# Patient Record
Sex: FEMALE | Race: WHITE | NOT HISPANIC OR LATINO
[De-identification: names, ages, dates, MRNs, and addresses within clinical notes are randomized per-mention and may not be internally consistent; named-entity substitution may affect disease eponyms.]

---

## 2017-01-18 ENCOUNTER — RESULT REVIEW (OUTPATIENT)
Age: 57
End: 2017-01-18

## 2017-04-12 ENCOUNTER — APPOINTMENT (OUTPATIENT)
Dept: DERMATOLOGY | Facility: CLINIC | Age: 57
End: 2017-04-12

## 2017-06-27 ENCOUNTER — APPOINTMENT (OUTPATIENT)
Dept: DERMATOLOGY | Facility: CLINIC | Age: 57
End: 2017-06-27

## 2017-10-23 ENCOUNTER — APPOINTMENT (OUTPATIENT)
Dept: DERMATOLOGY | Facility: CLINIC | Age: 57
End: 2017-10-23
Payer: SELF-PAY

## 2017-10-23 DIAGNOSIS — E65 LOCALIZED ADIPOSITY: ICD-10-CM

## 2017-10-23 PROCEDURE — D0118 KYBELLA: CPT

## 2017-10-31 ENCOUNTER — APPOINTMENT (OUTPATIENT)
Dept: DERMATOLOGY | Facility: CLINIC | Age: 57
End: 2017-10-31
Payer: COMMERCIAL

## 2017-10-31 VITALS — SYSTOLIC BLOOD PRESSURE: 110 MMHG | DIASTOLIC BLOOD PRESSURE: 64 MMHG

## 2017-10-31 DIAGNOSIS — Z12.83 ENCOUNTER FOR SCREENING FOR MALIGNANT NEOPLASM OF SKIN: ICD-10-CM

## 2017-10-31 DIAGNOSIS — L82.1 OTHER SEBORRHEIC KERATOSIS: ICD-10-CM

## 2017-10-31 DIAGNOSIS — L81.4 OTHER MELANIN HYPERPIGMENTATION: ICD-10-CM

## 2017-10-31 DIAGNOSIS — D23.9 OTHER BENIGN NEOPLASM OF SKIN, UNSPECIFIED: ICD-10-CM

## 2017-10-31 PROCEDURE — 99213 OFFICE O/P EST LOW 20 MIN: CPT | Mod: GC

## 2017-12-21 ENCOUNTER — APPOINTMENT (OUTPATIENT)
Dept: DERMATOLOGY | Facility: CLINIC | Age: 57
End: 2017-12-21
Payer: COMMERCIAL

## 2017-12-21 VITALS — SYSTOLIC BLOOD PRESSURE: 110 MMHG | DIASTOLIC BLOOD PRESSURE: 60 MMHG

## 2017-12-21 DIAGNOSIS — I78.1 NEVUS, NON-NEOPLASTIC: ICD-10-CM

## 2017-12-21 PROCEDURE — 99212 OFFICE O/P EST SF 10 MIN: CPT

## 2019-06-03 ENCOUNTER — RESULT REVIEW (OUTPATIENT)
Age: 59
End: 2019-06-03

## 2020-08-17 ENCOUNTER — APPOINTMENT (OUTPATIENT)
Dept: ORTHOPEDIC SURGERY | Facility: CLINIC | Age: 60
End: 2020-08-17
Payer: COMMERCIAL

## 2020-08-17 VITALS
HEIGHT: 62 IN | TEMPERATURE: 97.8 F | DIASTOLIC BLOOD PRESSURE: 84 MMHG | BODY MASS INDEX: 22.63 KG/M2 | HEART RATE: 73 BPM | WEIGHT: 123 LBS | SYSTOLIC BLOOD PRESSURE: 132 MMHG

## 2020-08-17 PROCEDURE — 99204 OFFICE O/P NEW MOD 45 MIN: CPT

## 2020-08-17 PROCEDURE — 72050 X-RAY EXAM NECK SPINE 4/5VWS: CPT

## 2020-08-26 ENCOUNTER — APPOINTMENT (OUTPATIENT)
Dept: ORTHOPEDIC SURGERY | Facility: CLINIC | Age: 60
End: 2020-08-26
Payer: COMMERCIAL

## 2020-08-26 VITALS — BODY MASS INDEX: 22.63 KG/M2 | HEIGHT: 62 IN | TEMPERATURE: 97.7 F | WEIGHT: 123 LBS

## 2020-08-26 PROCEDURE — 99214 OFFICE O/P EST MOD 30 MIN: CPT

## 2020-08-27 ENCOUNTER — TRANSCRIPTION ENCOUNTER (OUTPATIENT)
Age: 60
End: 2020-08-27

## 2020-09-02 ENCOUNTER — APPOINTMENT (OUTPATIENT)
Dept: SPINE | Facility: CLINIC | Age: 60
End: 2020-09-02
Payer: COMMERCIAL

## 2020-09-02 VITALS
SYSTOLIC BLOOD PRESSURE: 123 MMHG | HEIGHT: 62 IN | WEIGHT: 124 LBS | OXYGEN SATURATION: 100 % | DIASTOLIC BLOOD PRESSURE: 86 MMHG | BODY MASS INDEX: 22.82 KG/M2 | HEART RATE: 69 BPM | TEMPERATURE: 96.9 F

## 2020-09-02 PROCEDURE — 99204 OFFICE O/P NEW MOD 45 MIN: CPT

## 2020-09-02 RX ORDER — AMOXICILLIN 500 MG/1
500 CAPSULE ORAL
Qty: 21 | Refills: 0 | Status: DISCONTINUED | COMMUNITY
Start: 2020-07-23 | End: 2020-09-02

## 2020-09-02 RX ORDER — ALPRAZOLAM 0.25 MG/1
0.25 TABLET ORAL
Refills: 0 | Status: ACTIVE | COMMUNITY

## 2020-09-03 NOTE — RESULTS/DATA
[FreeTextEntry1] : C3-C4 disc osteophyte mostly on the left side\par C4-C5 disc bulge causing stenosis bilaterally\par C5-C6 disc osteophyte causing bilateral foraminal stenosis.

## 2020-09-03 NOTE — ASSESSMENT
[FreeTextEntry1] : See dictation by Dr. Vinny M.D.  A 3 level anterior cervical discectomy and fusion at C3-C4, C4-C5 and C5-C6 was discussed with the patient.  The patient does not choose to do surgery at this time.  She is going to consider it and is to start physical therapy.  She will call the office is she decides on having surgery.

## 2020-09-03 NOTE — HISTORY OF PRESENT ILLNESS
[de-identified] : JOSE HERNÁNDEZ is a 59 year old lady who has been followed by Dr. Nathan Irving for about 25 years regarding tingling and numbness in her hands.  She has received physical therapy may times throughout the years which she stated has helped.  The fell down the 13 steps on July 20 th and has worsening of her symptoms.  She has neck pain which radiates into mostly her left shoulder, forearm, elbow and hand.  She has taken steroids with little relief.  She feels that she has also been losing balance, feels tingling to the bottom.  She also c/o left ankle pain.\par

## 2020-09-03 NOTE — DATA REVIEWED
[de-identified] : Cervical spine done at [facility] on 8/20/2020.  Also images done on 4/29/2011 2011 and 4/6/ 2015 Lumbar 4/6/2015 thoracic 4/29/2011

## 2020-09-03 NOTE — PHYSICAL EXAM
[Place] : oriented to place [Person] : oriented to person [Time] : oriented to time [Short Term Intact] : short term memory intact [Remote Intact] : remote memory intact [Span Intact] : the attention span was normal [Concentration Intact] : normal concentrating ability [Fluency] : fluency intact [Comprehension] : comprehension intact [Current Events] : adequate knowledge of current events [Vocabulary] : adequate range of vocabulary [Past History] : adequate knowledge of personal past history [Cranial Nerves Optic (II)] : visual acuity intact bilaterally,  pupils equal round and reactive to light [Cranial Nerves Oculomotor (III)] : extraocular motion intact [Cranial Nerves Facial (VII)] : face symmetrical [Cranial Nerves Trigeminal (V)] : facial sensation intact symmetrically [Cranial Nerves Accessory (XI - Cranial And Spinal)] : head turning and shoulder shrug symmetric [Cranial Nerves Glossopharyngeal (IX)] : tongue and palate midline [Cranial Nerves Vestibulocochlear (VIII)] : hearing was intact bilaterally [Motor Tone] : muscle tone was normal in all four extremities [Motor Strength] : muscle strength was normal in all four extremities [Cranial Nerves Hypoglossal (XII)] : there was no tongue deviation with protrusion [No Muscle Atrophy] : normal bulk in all four extremities [4] : T1 abductor digiti minimi 4/5 [5] : S1 ankle flexors 5/5 [Abnormal Walk] : normal gait [Sensation Tactile Decrease] : light touch was intact [Balance] : balance was intact [2+] : Patella left 2+ [Past-pointing] : there was no past-pointing [Tremor] : no tremor present

## 2020-09-03 NOTE — REASON FOR VISIT
[New Patient Visit] : a new patient visit [Referred By: _________] : Patient was referred by EDITH [FreeTextEntry1] : The patient c/o neck pain with radiation down her left arm into her hand.

## 2020-10-21 ENCOUNTER — APPOINTMENT (OUTPATIENT)
Dept: ORTHOPEDIC SURGERY | Facility: CLINIC | Age: 60
End: 2020-10-21
Payer: COMMERCIAL

## 2020-10-21 VITALS — WEIGHT: 124 LBS | TEMPERATURE: 97.9 F | BODY MASS INDEX: 22.82 KG/M2 | HEIGHT: 62 IN

## 2020-10-21 PROCEDURE — 99213 OFFICE O/P EST LOW 20 MIN: CPT

## 2020-10-21 PROCEDURE — 99072 ADDL SUPL MATRL&STAF TM PHE: CPT

## 2020-12-02 ENCOUNTER — APPOINTMENT (OUTPATIENT)
Dept: ORTHOPEDIC SURGERY | Facility: CLINIC | Age: 60
End: 2020-12-02
Payer: COMMERCIAL

## 2020-12-02 VITALS — BODY MASS INDEX: 22.82 KG/M2 | HEIGHT: 62 IN | TEMPERATURE: 97.2 F | WEIGHT: 124 LBS

## 2020-12-02 DIAGNOSIS — M75.42 IMPINGEMENT SYNDROME OF LEFT SHOULDER: ICD-10-CM

## 2020-12-02 PROCEDURE — 99072 ADDL SUPL MATRL&STAF TM PHE: CPT

## 2020-12-02 PROCEDURE — 99214 OFFICE O/P EST MOD 30 MIN: CPT

## 2021-01-04 ENCOUNTER — APPOINTMENT (OUTPATIENT)
Dept: NEUROLOGY | Facility: CLINIC | Age: 61
End: 2021-01-04

## 2021-01-19 ENCOUNTER — APPOINTMENT (OUTPATIENT)
Dept: NEUROLOGY | Facility: CLINIC | Age: 61
End: 2021-01-19

## 2021-05-10 ENCOUNTER — APPOINTMENT (OUTPATIENT)
Dept: ORTHOPEDIC SURGERY | Facility: CLINIC | Age: 61
End: 2021-05-10

## 2021-05-17 ENCOUNTER — APPOINTMENT (OUTPATIENT)
Dept: ORTHOPEDIC SURGERY | Facility: CLINIC | Age: 61
End: 2021-05-17
Payer: COMMERCIAL

## 2021-05-17 VITALS — TEMPERATURE: 97.2 F | WEIGHT: 127 LBS | HEIGHT: 62 IN | BODY MASS INDEX: 23.37 KG/M2

## 2021-05-17 DIAGNOSIS — M75.41 IMPINGEMENT SYNDROME OF RIGHT SHOULDER: ICD-10-CM

## 2021-05-17 DIAGNOSIS — M75.42 IMPINGEMENT SYNDROME OF LEFT SHOULDER: ICD-10-CM

## 2021-05-17 PROCEDURE — 20610 DRAIN/INJ JOINT/BURSA W/O US: CPT | Mod: LT

## 2021-05-17 PROCEDURE — 99072 ADDL SUPL MATRL&STAF TM PHE: CPT

## 2021-05-17 PROCEDURE — 99215 OFFICE O/P EST HI 40 MIN: CPT | Mod: 25

## 2021-05-17 PROCEDURE — 73030 X-RAY EXAM OF SHOULDER: CPT | Mod: 50

## 2021-05-17 RX ORDER — METHYLPRED ACET/NACL,ISO-OS/PF 40 MG/ML
40 VIAL (ML) INJECTION
Qty: 1 | Refills: 0 | Status: COMPLETED | OUTPATIENT
Start: 2021-05-17

## 2021-05-17 RX ORDER — LIDOCAINE HYDROCHLORIDE 10 MG/ML
1 INJECTION, SOLUTION INFILTRATION; PERINEURAL
Refills: 0 | Status: COMPLETED | OUTPATIENT
Start: 2021-05-17

## 2021-05-17 RX ADMIN — METHYLPREDNISOLONE ACETATE 2 MG/ML: 40 INJECTION, SUSPENSION INTRALESIONAL; INTRAMUSCULAR; INTRASYNOVIAL; SOFT TISSUE at 00:00

## 2021-05-17 RX ADMIN — LIDOCAINE HYDROCHLORIDE 3 %: 10 INJECTION, SOLUTION INFILTRATION; PERINEURAL at 00:00

## 2021-06-14 ENCOUNTER — APPOINTMENT (OUTPATIENT)
Dept: ORTHOPEDIC SURGERY | Facility: CLINIC | Age: 61
End: 2021-06-14
Payer: COMMERCIAL

## 2021-06-14 VITALS — HEIGHT: 62 IN | TEMPERATURE: 97.8 F | WEIGHT: 127 LBS | BODY MASS INDEX: 23.37 KG/M2

## 2021-06-14 PROCEDURE — 99072 ADDL SUPL MATRL&STAF TM PHE: CPT

## 2021-06-14 PROCEDURE — 99213 OFFICE O/P EST LOW 20 MIN: CPT

## 2021-07-07 ENCOUNTER — APPOINTMENT (OUTPATIENT)
Dept: ORTHOPEDIC SURGERY | Facility: CLINIC | Age: 61
End: 2021-07-07

## 2021-10-27 ENCOUNTER — NON-APPOINTMENT (OUTPATIENT)
Age: 61
End: 2021-10-27

## 2021-10-27 ENCOUNTER — APPOINTMENT (OUTPATIENT)
Dept: ORTHOPEDIC SURGERY | Facility: CLINIC | Age: 61
End: 2021-10-27
Payer: COMMERCIAL

## 2021-10-27 VITALS
HEART RATE: 71 BPM | HEIGHT: 62 IN | WEIGHT: 127 LBS | SYSTOLIC BLOOD PRESSURE: 123 MMHG | BODY MASS INDEX: 23.37 KG/M2 | DIASTOLIC BLOOD PRESSURE: 79 MMHG

## 2021-10-27 DIAGNOSIS — M51.24 OTHER INTERVERTEBRAL DISC DISPLACEMENT, THORACIC REGION: ICD-10-CM

## 2021-10-27 PROCEDURE — 99214 OFFICE O/P EST MOD 30 MIN: CPT

## 2022-04-04 ENCOUNTER — APPOINTMENT (OUTPATIENT)
Dept: ORTHOPEDIC SURGERY | Facility: CLINIC | Age: 62
End: 2022-04-04

## 2022-04-07 ENCOUNTER — APPOINTMENT (OUTPATIENT)
Dept: OTOLARYNGOLOGY | Facility: CLINIC | Age: 62
End: 2022-04-07

## 2022-04-27 ENCOUNTER — APPOINTMENT (OUTPATIENT)
Dept: PULMONOLOGY | Facility: CLINIC | Age: 62
End: 2022-04-27

## 2022-05-17 ENCOUNTER — APPOINTMENT (OUTPATIENT)
Dept: PEDIATRIC ALLERGY IMMUNOLOGY | Facility: CLINIC | Age: 62
End: 2022-05-17

## 2022-10-31 ENCOUNTER — INPATIENT (INPATIENT)
Facility: HOSPITAL | Age: 62
LOS: 1 days | Discharge: HOME | DRG: 552 | End: 2022-11-02
Attending: STUDENT IN AN ORGANIZED HEALTH CARE EDUCATION/TRAINING PROGRAM | Admitting: STUDENT IN AN ORGANIZED HEALTH CARE EDUCATION/TRAINING PROGRAM
Payer: COMMERCIAL

## 2022-10-31 VITALS
HEIGHT: 62 IN | WEIGHT: 134.92 LBS | HEART RATE: 84 BPM | SYSTOLIC BLOOD PRESSURE: 148 MMHG | DIASTOLIC BLOOD PRESSURE: 92 MMHG | RESPIRATION RATE: 18 BRPM | OXYGEN SATURATION: 99 %

## 2022-10-31 DIAGNOSIS — R20.2 PARESTHESIA OF SKIN: ICD-10-CM

## 2022-10-31 LAB
ALBUMIN SERPL ELPH-MCNC: 4 G/DL — SIGNIFICANT CHANGE UP (ref 3.3–5)
ALP SERPL-CCNC: 85 U/L — SIGNIFICANT CHANGE UP (ref 40–120)
ALT FLD-CCNC: 20 U/L — SIGNIFICANT CHANGE UP (ref 10–45)
ANION GAP SERPL CALC-SCNC: 12 MMOL/L — SIGNIFICANT CHANGE UP (ref 5–17)
AST SERPL-CCNC: 32 U/L — SIGNIFICANT CHANGE UP (ref 10–40)
BASOPHILS # BLD AUTO: 0.05 K/UL — SIGNIFICANT CHANGE UP (ref 0–0.2)
BASOPHILS NFR BLD AUTO: 0.8 % — SIGNIFICANT CHANGE UP (ref 0–2)
BILIRUB SERPL-MCNC: 0.4 MG/DL — SIGNIFICANT CHANGE UP (ref 0.2–1.2)
BUN SERPL-MCNC: 8 MG/DL — SIGNIFICANT CHANGE UP (ref 7–23)
CALCIUM SERPL-MCNC: 8.9 MG/DL — SIGNIFICANT CHANGE UP (ref 8.4–10.5)
CHLORIDE SERPL-SCNC: 101 MMOL/L — SIGNIFICANT CHANGE UP (ref 96–108)
CO2 SERPL-SCNC: 21 MMOL/L — LOW (ref 22–31)
CREAT SERPL-MCNC: 0.76 MG/DL — SIGNIFICANT CHANGE UP (ref 0.5–1.3)
CRP SERPL-MCNC: 49 MG/L — HIGH (ref 0–4)
EGFR: 90 ML/MIN/1.73M2 — SIGNIFICANT CHANGE UP
EOSINOPHIL # BLD AUTO: 0.24 K/UL — SIGNIFICANT CHANGE UP (ref 0–0.5)
EOSINOPHIL NFR BLD AUTO: 4 % — SIGNIFICANT CHANGE UP (ref 0–6)
GLUCOSE SERPL-MCNC: 106 MG/DL — HIGH (ref 70–99)
HCT VFR BLD CALC: 40.3 % — SIGNIFICANT CHANGE UP (ref 34.5–45)
HGB BLD-MCNC: 13.6 G/DL — SIGNIFICANT CHANGE UP (ref 11.5–15.5)
IMM GRANULOCYTES NFR BLD AUTO: 0.2 % — SIGNIFICANT CHANGE UP (ref 0–0.9)
LYMPHOCYTES # BLD AUTO: 1.93 K/UL — SIGNIFICANT CHANGE UP (ref 1–3.3)
LYMPHOCYTES # BLD AUTO: 32.2 % — SIGNIFICANT CHANGE UP (ref 13–44)
MAGNESIUM SERPL-MCNC: 1.9 MG/DL — SIGNIFICANT CHANGE UP (ref 1.6–2.6)
MCHC RBC-ENTMCNC: 29.4 PG — SIGNIFICANT CHANGE UP (ref 27–34)
MCHC RBC-ENTMCNC: 33.7 GM/DL — SIGNIFICANT CHANGE UP (ref 32–36)
MCV RBC AUTO: 87 FL — SIGNIFICANT CHANGE UP (ref 80–100)
MONOCYTES # BLD AUTO: 0.6 K/UL — SIGNIFICANT CHANGE UP (ref 0–0.9)
MONOCYTES NFR BLD AUTO: 10 % — SIGNIFICANT CHANGE UP (ref 2–14)
NEUTROPHILS # BLD AUTO: 3.17 K/UL — SIGNIFICANT CHANGE UP (ref 1.8–7.4)
NEUTROPHILS NFR BLD AUTO: 52.8 % — SIGNIFICANT CHANGE UP (ref 43–77)
NRBC # BLD: 0 /100 WBCS — SIGNIFICANT CHANGE UP (ref 0–0)
PHOSPHATE SERPL-MCNC: 2.4 MG/DL — LOW (ref 2.5–4.5)
PLATELET # BLD AUTO: 168 K/UL — SIGNIFICANT CHANGE UP (ref 150–400)
POTASSIUM SERPL-MCNC: 4.5 MMOL/L — SIGNIFICANT CHANGE UP (ref 3.5–5.3)
POTASSIUM SERPL-SCNC: 4.5 MMOL/L — SIGNIFICANT CHANGE UP (ref 3.5–5.3)
PROT SERPL-MCNC: 6.8 G/DL — SIGNIFICANT CHANGE UP (ref 6–8.3)
RBC # BLD: 4.63 M/UL — SIGNIFICANT CHANGE UP (ref 3.8–5.2)
RBC # FLD: 12.7 % — SIGNIFICANT CHANGE UP (ref 10.3–14.5)
SODIUM SERPL-SCNC: 134 MMOL/L — LOW (ref 135–145)
WBC # BLD: 6 K/UL — SIGNIFICANT CHANGE UP (ref 3.8–10.5)
WBC # FLD AUTO: 6 K/UL — SIGNIFICANT CHANGE UP (ref 3.8–10.5)

## 2022-10-31 PROCEDURE — 99285 EMERGENCY DEPT VISIT HI MDM: CPT

## 2022-10-31 RX ORDER — ALPRAZOLAM 0.25 MG
1 TABLET ORAL
Qty: 0 | Refills: 0 | DISCHARGE

## 2022-10-31 RX ORDER — ESCITALOPRAM OXALATE 10 MG/1
10 TABLET, FILM COATED ORAL DAILY
Refills: 0 | Status: DISCONTINUED | OUTPATIENT
Start: 2022-10-31 | End: 2022-11-02

## 2022-10-31 RX ORDER — FLUTICASONE FUROATE AND VILANTEROL TRIFENATATE 100; 25 UG/1; UG/1
0 POWDER RESPIRATORY (INHALATION)
Qty: 0 | Refills: 0 | DISCHARGE

## 2022-10-31 RX ORDER — ESCITALOPRAM OXALATE 10 MG/1
1 TABLET, FILM COATED ORAL
Qty: 0 | Refills: 0 | DISCHARGE

## 2022-10-31 RX ORDER — ESOMEPRAZOLE MAGNESIUM 40 MG/1
1 CAPSULE, DELAYED RELEASE ORAL
Qty: 0 | Refills: 0 | DISCHARGE

## 2022-10-31 NOTE — H&P ADULT - NSICDXPASTMEDICALHX_GEN_ALL_CORE_FT
PAST MEDICAL HISTORY:  Asthma     Degenerative arthritis of cervical spine     HLD (hyperlipidemia)     Migraines     Stenosis, cervical spine     Synovial cyst of lumbar spine

## 2022-10-31 NOTE — ED PROVIDER NOTE - OBJECTIVE STATEMENT
62 year old F with PMH Migraines, Asthma, HLD, cervical spine stenosis, arthritis of the spine, lumbar synovial cyst presenting with bilateral LE "pins and needles" x1 days. Patient got her COVID booster and flu shot on Saturday. As day progressed, starting feeling myalgias, chills, GI upset. Then Sunday morning woke up feeling BL LE paresthesias, starting in the feet, then progressing upward to the waist. 62 year old F with PMH Migraines, Asthma, HLD, cervical spine stenosis, arthritis of the spine, lumbar synovial cyst presenting with bilateral LE "pins and needles" x1 days. Patient got her COVID booster and flu shot on Saturday. As day progressed, starting feeling myalgias, chills, GI upset. Then Sunday morning woke up feeling BL LE paresthesias, starting in the feet, then progressing upward to the waist. Denies HA, vision changes, bowel or bladder incontinence, gait abnormalities, confusion, syncope, CP, SOB.

## 2022-10-31 NOTE — H&P ADULT - HISTORY OF PRESENT ILLNESS
63 y/o female with PMHx Migraines, Asthma, HLD, cervical spine stenosis, arthritis of the spine, lumbar synovial cyst presenting with pins/needles b/l LE. On Saturday AM, patient received COVID19 moderna booster and flue vaccine along with her . All day Saturday she felt fine and after dinner, she started feeling chills, muscle aches, off balance, GI issues.  felt the same way. On Sunday AM, patient woke up and felt pins and needles on her entire left foot. During the day on Sunday, patient felt like this sensation was radiating up her left leg and today, patient felt pins/needles was now widespread in b/l LE up to her waist. Denies numbness, pain, vision changes, bowel/bladder incontinence.     Of note, patient has an extensive hx of degenerative spine disease and cervical stenosis. Patient says last MRI c spine showed severe cervical spine stenosis for which orthopedic surgeon (Dr. Irving) said it is up to patient if she wants to do surgery. Patient will feel intermittent pins and needles in her hands along with weakness. Patient has also been complaining of dragging her left foot which has been progressing. She goes to physical therapy. Patient did endorse left sided pins/needles appear more noticeable than right sided and that with her spine disease, she has felt discomfort on her left side already.     Over the last 6 months, patient has had abdominal pain, work up revealed 7 cm right ovarian fibroid, pedunculated and necrotic in nature, removal scheduled Nov 17.  63 y/o female with PMHx Migraines, Asthma, HLD, cervical spine stenosis, arthritis of the spine, lumbar synovial cyst presenting with pins/needles b/l LE. On Saturday AM, patient received COVID19 moderna booster and flue vaccine along with her . All day Saturday she felt fine and after dinner, she started feeling chills, muscle aches, off balance, GI issues.  felt the same way. On Sunday AM, patient woke up and felt pins and needles on her entire left foot. During the day on Sunday, patient felt like this sensation was radiating up her left leg and today, patient felt pins/needles was now widespread in b/l LE up to her waist. Denies numbness, pain, vision changes, bowel/bladder incontinence, difficulty raising eyelids, difficulty swallowing. EMG in the past, per patient, has been normal.     Of note, patient has an extensive hx of degenerative spine disease and cervical stenosis. Patient says last MRI c spine showed severe cervical spine stenosis for which orthopedic surgeon (Dr. Irving) said it is up to patient if she wants to do surgery. Patient will feel intermittent pins and needles in her hands along with weakness. Patient has also been complaining of dragging her left foot which has been progressing. She goes to physical therapy. Patient did endorse left sided pins/needles appear more noticeable than right sided and that with her spine disease, she has felt discomfort on her left side already.     Over the last 6 months, patient has had abdominal pain, work up revealed 7 cm right ovarian fibroid, pedunculated and necrotic in nature, removal scheduled Nov 17.

## 2022-10-31 NOTE — ED ADULT NURSE REASSESSMENT NOTE - NS ED NURSE REASSESS COMMENT FT1
Pt bladder scanned after voiding < 80cc of urine noted on scan. MD aware. Pt bladder scanned after voiding < 80cc of urine noted on scan. MD aware. See paper neuro flow for neuro assessment.

## 2022-10-31 NOTE — ED PROVIDER NOTE - CLINICAL SUMMARY MEDICAL DECISION MAKING FREE TEXT BOX
62 year old F with PMH Migraines, Asthma, HLD, cervical spine stenosis, arthritis of the spine, lumbar synovial cyst presenting with bilateral LE "pins and needles" x1 days. VSS, well appearing. Non focal neuro exam. Low suspicion for cauda equina or cord compression. Ddx includes transverse myelitis vs MS. Will get labs, neuro consult. Dispo pending recommendations. 62 year old F with PMH Migraines, Asthma, HLD, cervical spine stenosis, arthritis of the spine, lumbar synovial cyst presenting with bilateral LE "pins and needles" x1 days. VSS, well appearing. Non focal neuro exam. Low suspicion for cauda equina or cord compression. Ddx includes transverse myelitis vs MS. Will get labs, neuro consult. Dispo pending recommendations.  Attending Maryanne Montanez: 62 yfeoame presenting with paresthesias. pt recently had covid and flu vaccine. upon arrival pt hemodynamically stable. reflexes intact, less likely GBS. consider possible transverse myelitis. pt with h/o cerivcal and lumbar disc disease. pt states pain is different from MSK Lukas. no bowel or bladder incontinence. will d/w neuro. consider MRI to furthere valute

## 2022-10-31 NOTE — ED PROVIDER NOTE - NS ED ROS FT
CONST: no fevers, no chills  EYES: no pain, no vision changes  ENT: no sore throat, no ear pain, no change in hearing  CV: no chest pain, no leg swelling  RESP: no shortness of breath, no cough  ABD: no abdominal pain, no nausea, no vomiting, no diarrhea  : no dysuria, no flank pain, no hematuria  MSK: no back pain, no extremity pain  NEURO: no headache, + LE "pins and needles"  HEME: no easy bleeding  SKIN:  no rash

## 2022-10-31 NOTE — H&P ADULT - NSHPLABSRESULTS_GEN_ALL_CORE
.  LABS:                         13.6   6.00  )-----------( 168      ( 31 Oct 2022 20:27 )             40.3     10-31    134<L>  |  101  |  8   ----------------------------<  106<H>  4.5   |  21<L>  |  0.76    Ca    8.9      31 Oct 2022 20:27  Phos  2.4     10-31  Mg     1.9     10-31    TPro  6.8  /  Alb  4.0  /  TBili  0.4  /  DBili  x   /  AST  32  /  ALT  20  /  AlkPhos  85  10-31              RADIOLOGY: none

## 2022-10-31 NOTE — ED ADULT NURSE NOTE - OBJECTIVE STATEMENT
61 y/o female presenting to ed ambulatory, A&ox3, complaining of numbness/tingling in bilateral lower extremities. pt states she received the covid booster and flu vaccine Saturday and since yesterday has had worsening numbness/tingling "pins and needle feeling" in bilateral lower extremities radiating up to lower waist. pt reports feeling like she was having difficulty starting to void. reports unsteady gait requiring usage of a cane. pt denies bowel/bladder incontinence, fevers, n/v/d, sob, recent trauma. breathing spontaneous and unlabored. upper extremities/lower extremities equal in strength and sensation on exam. reports feeling weaker on left side at baseline from back injury. no drift noted in b/l upper/lower extrem. Safety and comfort measures provided, bed locked and in lowest position, side rails up for safety. Call bell within reach. Awaiting md sen.

## 2022-10-31 NOTE — ED PROVIDER NOTE - ATTENDING CONTRIBUTION TO CARE
Attending MD Maryanne Montanez:  I personally have seen and examined this patient.  Resident note reviewed and agree on plan of care and except where noted.  See HPI, PE, and MDM for details.

## 2022-10-31 NOTE — ED ADULT TRIAGE NOTE - CHIEF COMPLAINT QUOTE
pt got covid booster Friday, today developed b/l LE "pins and needles" to both legs up to waist.  denies saddle anesthesia but does endorse difficulty starting stream when urinating today. saw MD and was sent to ED.

## 2022-10-31 NOTE — ED PROVIDER NOTE - PHYSICAL EXAMINATION
GENERAL: Awake, alert, NAD  HEENT: NC/AT, moist mucous membranes, PERRL, EOMI  LUNGS: CTAB, no wheezes or crackles   CARDIAC: RRR, no m/r/g  ABDOMEN: Soft, normal BS, non tender, non distended, no rebound, no guarding  BACK: No midline spinal tenderness, no CVA tenderness  EXT: No edema, no calf tenderness, 2+ DP pulses bilaterally, no deformities.  NEURO: A&Ox3. Moving all extremities.  SKIN: Warm and dry. No rash.  PSYCH: Normal affect. GENERAL: Awake, alert, NAD  HEENT: NC/AT, moist mucous membranes, PERRL, EOMI  LUNGS: CTAB, no wheezes or crackles   CARDIAC: RRR, no m/r/g  ABDOMEN: Soft, normal BS, non tender, non distended, no rebound, no guarding  BACK: No midline spinal tenderness, no CVA tenderness  EXT: No edema, no calf tenderness, 2+ DP pulses bilaterally, no deformities.  NEURO: A&Ox3. Moving all extremities.  SKIN: Warm and dry. No rash.  PSYCH: Normal affect.  Attending Maryanne Montanez: Gen: nad, heent: atraumatic, eomi, perrla, mmm, neck; nttp, cv: rrr, lungs; ctab, abd: soft, nontender, nondistended, no peritoneal signs. ext: wwp, neuro: awake and alert following command aptellar reflexes intact, strenth intact

## 2022-10-31 NOTE — H&P ADULT - ATTENDING SUPERVISION STATEMENT
----- Message from Elsa Bryant PA-C sent at 11/16/2018  1:55 PM CST -----  Contact: saira ( wife)   That was for the bandaid/tape.. Not the drain. I understand that Dr Plascencia removed the drain which is fine.     Thanks!  ----- Message -----  From: Leonora Anton MA  Sent: 11/15/2018  11:48 AM  To: Elsa Bryant PA-C    She said on the tape it has remove it two days   ----- Message -----  From: Elsa Bryant PA-C  Sent: 11/15/2018  10:10 AM  To: Leonora Anton MA    I dont think he was told to remove the drain today. He was supposed to follow up next week with Dr. Leblanc for drain removal though.     Elsa    ----- Message -----  From: Leonora Anton MA  Sent: 11/14/2018   3:37 PM  To: Elsa Bryant PA-C        ----- Message -----  From: Sal Hdz  Sent: 11/14/2018   3:16 PM  To: Annette MOSCOSO Staff     Need to remove drain today and pt wasn't given instructions, would like cb.          ..[phone] (home)                   Resident

## 2022-10-31 NOTE — H&P ADULT - NSHPPHYSICALEXAM_GEN_ALL_CORE
General Exam:   General appearance: No acute distress    Cardiac: RRR  Pulm:         RA        Neurological Exam:  Mental Status: Orientated to self, date and place.  Attention intact.  No dysarthria. Speech fluent.  Cranial Nerves:   Pupillar hippus, EOMI, VFF, no nystagmus.    CN V1-3 intact to light touch .  No facial asymmetry.  Hearing intact to finger rub bilaterally.  Tongue, uvula and palate midline.  Sternocleidomastoid and Trapezius intact bilaterally.    Motor:   Tone: normal.                  Strength:     [] Upper extremity                      Delt       Bicep    Tricep                                                  R         5/5 5/5 5/5 5/5                                               L          5/5 5/5 5/5 5/5  [] Lower extremity                       HF          KE          KF        DF         PF                                               R        5/5 5/5 5/5 5/5 5/5                                               L         5/5 5/5 5/5 5/5 5/5  Pronator drift: none                 Dysmetria: None to finger-nose-finger or heel-shin-heel  No truncal ataxia.    Tremor: No resting, postural or action tremor.  No myoclonus.    Sensation: intact to light touch, pinprick, and proprioception    Deep Tendon Reflexes:     Biceps          Triceps      BR        Patellar        Ankle         Babinski                               R       2+                   2+           3+            3+               2+           downgoing                              L        2+                  2+           3+            3+               2+           neutral    Gait: Drags left foot when walking General Exam:   General appearance: No acute distress    Cardiac: RRR  Pulm:         RA        Neurological Exam:  Mental Status: Orientated to self, date and place.  Attention intact.  No dysarthria. Speech fluent.  Cranial Nerves:   Pupillar hippus, EOMI, VFF, no nystagmus.    CN V1-3 intact to light touch .  No facial asymmetry.  Hearing intact to finger rub bilaterally.  Tongue, uvula and palate midline.  Sternocleidomastoid and Trapezius intact bilaterally.    Motor:   Tone: normal.                  Strength:     [] Upper extremity                      Delt       Bicep    Tricep                                                  R         5/5 5/5 5/5 5/5                                               L          5/5 5/5 5/5 5/5  [] Lower extremity                       HF          KE          KF        DF         PF                                               R        5/5 5/5 5/5 5/5 5/5                                               L         5/5 5/5 5/5 5/5 5/5  Pronator drift: none                 Dysmetria: None to finger-nose-finger or heel-shin-heel  No truncal ataxia.    Tremor: No resting, postural or action tremor.  No myoclonus.    Sensation: intact to light touch, pinprick, and proprioception    Deep Tendon Reflexes:     Biceps          Triceps      BR        Patellar        Ankle         Plantars                               R       2+                   2+           3+            3+               2+           downgoing                              L        2+                  2+           3+            3+               2+           neutral    Gait: Drags left foot when walking

## 2022-10-31 NOTE — ED ADULT NURSE NOTE - INTERVENTIONS DEFINITIONS
[place] to call system/Call bell, personal items and telephone within reach/Instruct patient to call for assistance/Physically safe environment: no spills, clutter or unnecessary equipment/Stretcher in lowest position, wheels locked, appropriate side rails in place/Monitor gait and stability

## 2022-10-31 NOTE — H&P ADULT - ASSESSMENT
61 y/o female with PMHx Migraines, Asthma, HLD, cervical spine stenosis, arthritis of the spine, lumbar synovial cyst presenting with pins/needles b/l LE. On Saturday AM, patient received COVID19 moderna booster and flue vaccine along with her . All day Saturday she felt fine and after dinner, she started feeling chills, muscle aches, off balance, GI issues.  felt the same way. On Sunday AM, patient woke up and felt pins and needles on her entire left foot. During the day on Sunday, patient felt like this sensation was radiating up her left leg and today, patient felt pins/needles was now widespread in b/l LE up to her waist. Denies numbness, pain, vision changes, bowel/bladder incontinence.     Of note, patient has an extensive hx of degenerative spine disease and cervical stenosis. Patient says last MRI c spine showed severe cervical spine stenosis for which orthopedic surgeon (Dr. Irving) said it is up to patient if she wants to do surgery. Patient will feel intermittent pins and needles in her hands along with weakness. Patient has also been complaining of dragging her left foot which has been progressing. She goes to physical therapy. Patient did endorse left sided pins/needles appear more noticeable than right sided and that with her spine disease, she has felt discomfort on her left side already.     Over the last 6 months, patient has had abdominal pain, work up revealed 7 cm right ovarian fibroid, pedunculated and necrotic in nature, removal scheduled Nov 17.     Impression: Subjective pins/needles with generalized spreading in no specific dermatomal distribution with no sensory deficit on exam with sx developing after vaccines and iso spine disease requiring ruling out transverse myelitis vs immune modulating disease. Less likely GBS given intact reflex and no weakness. Progression of spine disease unclear.     Recommendations:   []Admit to neurology floor   []MRI orbits, brain, thoracic, lumbar with and without contrast   [] check UA, Utox,, TSH, T3/T4, RPR, antithyroglobulin Ab, TPO Ab, vitamin B1, B6, B12, folate, homocysteine, methylmalonic acid, lactate, creatnine kinase, ammonia, ESR, CRP  []NSG consult   []q4 neurochecks and vitals   []DVT ppx: Lovenox 40 mg SQ    Case to be seen by general neurology attending, Dr. Márquez.  63 y/o female with PMHx Migraines, Asthma, HLD, cervical spine stenosis, arthritis of the spine, lumbar synovial cyst presenting with pins/needles b/l LE. On Saturday AM, patient received COVID19 moderna booster and flue vaccine along with her . All day Saturday she felt fine and after dinner, she started feeling chills, muscle aches, off balance, GI issues.  felt the same way. On Sunday AM, patient woke up and felt pins and needles on her entire left foot. During the day on Sunday, patient felt like this sensation was radiating up her left leg and today, patient felt pins/needles was now widespread in b/l LE up to her waist. Denies numbness, pain, vision changes, bowel/bladder incontinence, difficulty raising eyelids, difficulty swallowing. EMG in the past, per patient, has been normal.     Of note, patient has an extensive hx of degenerative spine disease and cervical stenosis. Patient says last MRI c spine showed severe cervical spine stenosis for which orthopedic surgeon (Dr. Irving) said it is up to patient if she wants to do surgery. Patient will feel intermittent pins and needles in her hands along with weakness. Patient has also been complaining of dragging her left foot which has been progressing. She goes to physical therapy. Patient did endorse left sided pins/needles appear more noticeable than right sided and that with her spine disease, she has felt discomfort on her left side already.     Over the last 6 months, patient has had abdominal pain, work up revealed 7 cm right ovarian fibroid, pedunculated and necrotic in nature, removal scheduled Nov 17.     Impression: Subjective pins/needles with generalized spreading in no specific dermatomal distribution with no sensory deficit on exam with sx developing after vaccines and iso spine disease requiring ruling out transverse myelitis vs immune modulating disease. Less likely GBS given intact reflex and no weakness. Progression of spine disease unclear.     Recommendations:   []Admit to neurology floor   []MRI orbits, brain, thoracic, lumbar with and without contrast   [] check UA, Utox,, TSH, T3/T4, RPR, antithyroglobulin Ab, TPO Ab, vitamin B1, B6, B12, folate, homocysteine, methylmalonic acid, lactate, creatnine kinase, ammonia, ESR, CRP  []NSG consult   []q4 neurochecks and vitals   []DVT ppx: Lovenox 40 mg SQ    Case to be seen by general neurology attending, Dr. Márquez.

## 2022-10-31 NOTE — H&P ADULT - ATTENDING COMMENTS
Patient seen and examined with neurology team 11/1/2022 AM    68 year old female with hx of degenerative spine disease with cervical spine stenosis (followed by Orthopedic, Dr. Irving), Migraine headaches, Asthma, right uterine fibroid (new diagnosis, scheduled resection 11/17) presents with new onset ascending lower extremity paresthesias and gait instability since 10/29/2022, after receiving vaccinations for COVID and the Flu.     Neurological exam with no motor weakness in UE or LE. Slight increased tone at the ankles (L>R). Reflexes 2+ in UE and LE, thought relatively brisk R biceps compared to left biceps. No ty, no cross adductors, no clonus. Plantar reflex down going on right and mute on the left. Sensations reduced to pinprick over left leg (over shin), though intact over foot. Vibration sensation intact in UE and asymmetric in LE (R toe 10 seconds, L toe 20 seconds, R knee 16 seconds, L knee 9 seconds). Abnormal gait, drags feet, slightly spastic appearing. Cannot tandem walk. Romberg is absent.     Imp:- Ascending LE paresthesias and gait imbalance in the context of recent COVID/FLU vaccination- need to rule out possible transverse myelitis. Lower suspicion for GBS, given intact reflexes.   Awaiting results of MRI neuroaxis w/w/o contrast- if there are signs for brain/cord inflammation- will plan for IV solumedrol 1000 mg QD for 3-5 days (depending on symptoms improvement).   May plan for LP after MRI's completed.   PT consult Patient seen and examined with neurology team 11/1/2022 AM    68 year old female with hx of degenerative spine disease with cervical spine stenosis (followed by Orthopedic, Dr. Irving), Migraine headaches, Asthma, right uterine fibroid (new diagnosis, scheduled resection 11/17) presents with new onset ascending lower extremity paresthesias and gait instability since 10/29/2022, after receiving vaccinations for COVID and the Flu.     Neurological exam with no motor weakness in UE or LE. Slight increased tone at the ankles (L>R). Reflexes 2+ in UE and LE, thought relatively brisk R biceps compared to left biceps. No ty, no cross adductors, no clonus. Plantar reflex down going on right and mute on the left. Sensations reduced to pinprick over left leg (over shin), though intact over foot. Vibration sensation intact in UE and asymmetric in LE (R toe 10 seconds, L toe 20 seconds, R knee 16 seconds, L knee 9 seconds). Abnormal gait, drags feet, slightly spastic appearing. Cannot tandem walk. Romberg is absent.     Imp:- Ascending LE paresthesias and gait imbalance in the context of recent COVID/FLU vaccination- need to rule out possible transverse myelitis. Lower suspicion for GBS, given intact reflexes. Cannot necessarily localize objective sensory deficits on exam.   Awaiting results of MRI neuroaxis w/w/o contrast- if there are signs for brain/cord inflammation- will plan for IV solumedrol 1000 mg QD for 3-5 days (depending on symptoms improvement).   May plan for LP after MRI's completed.   PT consult Patient seen and examined with neurology team 11/1/2022 AM    68 year old female with hx of degenerative spine disease with cervical spine stenosis (followed by Orthopedic, Dr. Irving), Migraine headaches, Asthma, right uterine fibroid (new diagnosis, scheduled resection 11/17) presents with new onset ascending lower extremity paresthesias and gait instability since 10/29/2022, after receiving vaccinations for COVID and the Flu.     Reviewed MRI cervical spine images/report from [facility] 8/20/2020- multilevel cervical spondylosis with posterior osteophytes indenting the anterior aspect of thecal sac most marked at the C3-4 and C5-6 levels with AP diameter spinal canal measures 7 mm.  Moderate severe left C3-4, severe bilateral C4-5, severe bilateral C5-6 and mild bilateral  C6-7 foraminal stenosis. No cord signal abnormality.    Neurological exam with no motor weakness in UE or LE. Slight increased tone at the ankles (L>R). Reflexes 2+ in UE and LE, thought relatively brisk R biceps compared to left biceps. No ty, no cross adductors, no clonus. Plantar reflex down going on right and mute on the left. Sensations reduced to pinprick over left leg (over shin), though intact over foot. Vibration sensation intact in UE and asymmetric in LE (R toe 10 seconds, L toe 20 seconds, R knee 16 seconds, L knee 9 seconds). Abnormal gait, drags feet, slightly spastic appearing. Cannot tandem walk. Romberg is absent.     Imp:- Ascending LE paresthesias and gait imbalance in the context of recent COVID/FLU vaccination- need to rule out possible transverse myelitis. Lower suspicion for GBS, given intact reflexes. Cannot necessarily localize objective sensory deficits on exam.   Awaiting results of MRI neuroaxis w/w/o contrast- if there are signs for brain/cord inflammation- will plan for IV solumedrol 1000 mg QD for 3-5 days (depending on symptoms improvement).   May plan for LP after MRI's completed.   PT consult

## 2022-11-01 LAB
ALBUMIN SERPL ELPH-MCNC: 4.3 G/DL — SIGNIFICANT CHANGE UP (ref 3.3–5)
ALP SERPL-CCNC: 81 U/L — SIGNIFICANT CHANGE UP (ref 40–120)
ALT FLD-CCNC: 18 U/L — SIGNIFICANT CHANGE UP (ref 10–45)
AMMONIA BLD-MCNC: 12 UMOL/L — SIGNIFICANT CHANGE UP (ref 11–55)
ANION GAP SERPL CALC-SCNC: 11 MMOL/L — SIGNIFICANT CHANGE UP (ref 5–17)
AST SERPL-CCNC: 17 U/L — SIGNIFICANT CHANGE UP (ref 10–40)
B BURGDOR C6 AB SER-ACNC: NEGATIVE — SIGNIFICANT CHANGE UP
B BURGDOR IGG+IGM SER-ACNC: 0.53 INDEX — SIGNIFICANT CHANGE UP (ref 0.01–0.89)
BILIRUB SERPL-MCNC: 0.4 MG/DL — SIGNIFICANT CHANGE UP (ref 0.2–1.2)
BUN SERPL-MCNC: 11 MG/DL — SIGNIFICANT CHANGE UP (ref 7–23)
CALCIUM SERPL-MCNC: 9.6 MG/DL — SIGNIFICANT CHANGE UP (ref 8.4–10.5)
CERULOPLASMIN SERPL-MCNC: 30 MG/DL — SIGNIFICANT CHANGE UP (ref 16–45)
CHLORIDE SERPL-SCNC: 104 MMOL/L — SIGNIFICANT CHANGE UP (ref 96–108)
CK SERPL-CCNC: 58 U/L — SIGNIFICANT CHANGE UP (ref 25–170)
CO2 SERPL-SCNC: 24 MMOL/L — SIGNIFICANT CHANGE UP (ref 22–31)
CREAT SERPL-MCNC: 0.78 MG/DL — SIGNIFICANT CHANGE UP (ref 0.5–1.3)
CRP SERPL-MCNC: 35 MG/L — HIGH (ref 0–4)
EGFR: 87 ML/MIN/1.73M2 — SIGNIFICANT CHANGE UP
ERYTHROCYTE [SEDIMENTATION RATE] IN BLOOD: 5 MM/HR — SIGNIFICANT CHANGE UP (ref 0–20)
ERYTHROCYTE [SEDIMENTATION RATE] IN BLOOD: 8 MM/HR — SIGNIFICANT CHANGE UP (ref 0–20)
FT4I SERPL CALC-MCNC: 2.1 FTI% — SIGNIFICANT CHANGE UP (ref 1.4–4.8)
FT4I SERPL CALC-MCNC: 5.7 INDEX — SIGNIFICANT CHANGE UP (ref 4.4–11.4)
GLUCOSE SERPL-MCNC: 103 MG/DL — HIGH (ref 70–99)
HCT VFR BLD CALC: 40.6 % — SIGNIFICANT CHANGE UP (ref 34.5–45)
HCV AB S/CO SERPL IA: 0.21 S/CO — SIGNIFICANT CHANGE UP (ref 0–0.99)
HCV AB SERPL-IMP: SIGNIFICANT CHANGE UP
HCYS SERPL-MCNC: 9.3 UMOL/L — SIGNIFICANT CHANGE UP
HGB BLD-MCNC: 13.6 G/DL — SIGNIFICANT CHANGE UP (ref 11.5–15.5)
LACTATE SERPL-SCNC: 1.7 MMOL/L — SIGNIFICANT CHANGE UP (ref 0.5–2)
MAGNESIUM SERPL-MCNC: 2.2 MG/DL — SIGNIFICANT CHANGE UP (ref 1.6–2.6)
MCHC RBC-ENTMCNC: 29.2 PG — SIGNIFICANT CHANGE UP (ref 27–34)
MCHC RBC-ENTMCNC: 33.5 GM/DL — SIGNIFICANT CHANGE UP (ref 32–36)
MCV RBC AUTO: 87.3 FL — SIGNIFICANT CHANGE UP (ref 80–100)
NRBC # BLD: 0 /100 WBCS — SIGNIFICANT CHANGE UP (ref 0–0)
PHOSPHATE SERPL-MCNC: 2.4 MG/DL — LOW (ref 2.5–4.5)
PLATELET # BLD AUTO: 193 K/UL — SIGNIFICANT CHANGE UP (ref 150–400)
POTASSIUM SERPL-MCNC: 5.1 MMOL/L — SIGNIFICANT CHANGE UP (ref 3.5–5.3)
POTASSIUM SERPL-SCNC: 5.1 MMOL/L — SIGNIFICANT CHANGE UP (ref 3.5–5.3)
PROT SERPL-MCNC: 6.9 G/DL — SIGNIFICANT CHANGE UP (ref 6–8.3)
RBC # BLD: 4.65 M/UL — SIGNIFICANT CHANGE UP (ref 3.8–5.2)
RBC # FLD: 12.5 % — SIGNIFICANT CHANGE UP (ref 10.3–14.5)
SARS-COV-2 RNA SPEC QL NAA+PROBE: SIGNIFICANT CHANGE UP
SODIUM SERPL-SCNC: 139 MMOL/L — SIGNIFICANT CHANGE UP (ref 135–145)
T3/T3 UPTAKE INDEX SERPL-RTO: 34 % — SIGNIFICANT CHANGE UP (ref 28–41)
T4 AB SER-ACNC: 6.1 UG/DL — SIGNIFICANT CHANGE UP (ref 4.6–12)
T4/T3 UPTAKE INDEX SERPL: 1.1 TBI — SIGNIFICANT CHANGE UP (ref 0.8–1.3)
TSH SERPL-MCNC: 1.97 UIU/ML — SIGNIFICANT CHANGE UP (ref 0.27–4.2)
VIT B12 SERPL-MCNC: 517 PG/ML — SIGNIFICANT CHANGE UP (ref 232–1245)
VIT B12 SERPL-MCNC: 593 PG/ML — SIGNIFICANT CHANGE UP (ref 232–1245)
WBC # BLD: 6.12 K/UL — SIGNIFICANT CHANGE UP (ref 3.8–10.5)
WBC # FLD AUTO: 6.12 K/UL — SIGNIFICANT CHANGE UP (ref 3.8–10.5)

## 2022-11-01 PROCEDURE — 72141 MRI NECK SPINE W/O DYE: CPT | Mod: 26

## 2022-11-01 PROCEDURE — 70551 MRI BRAIN STEM W/O DYE: CPT | Mod: 26

## 2022-11-01 PROCEDURE — 72148 MRI LUMBAR SPINE W/O DYE: CPT | Mod: 26

## 2022-11-01 PROCEDURE — 72146 MRI CHEST SPINE W/O DYE: CPT | Mod: 26

## 2022-11-01 PROCEDURE — 99223 1ST HOSP IP/OBS HIGH 75: CPT

## 2022-11-01 RX ORDER — LANOLIN ALCOHOL/MO/W.PET/CERES
5 CREAM (GRAM) TOPICAL AT BEDTIME
Refills: 0 | Status: DISCONTINUED | OUTPATIENT
Start: 2022-11-01 | End: 2022-11-02

## 2022-11-01 RX ORDER — CYCLOBENZAPRINE HYDROCHLORIDE 10 MG/1
5 TABLET, FILM COATED ORAL ONCE
Refills: 0 | Status: COMPLETED | OUTPATIENT
Start: 2022-11-01 | End: 2022-11-01

## 2022-11-01 RX ORDER — ACETAMINOPHEN 500 MG
650 TABLET ORAL ONCE
Refills: 0 | Status: COMPLETED | OUTPATIENT
Start: 2022-11-01 | End: 2022-11-01

## 2022-11-01 RX ORDER — KETOROLAC TROMETHAMINE 30 MG/ML
15 SYRINGE (ML) INJECTION ONCE
Refills: 0 | Status: DISCONTINUED | OUTPATIENT
Start: 2022-11-01 | End: 2022-11-01

## 2022-11-01 RX ADMIN — ESCITALOPRAM OXALATE 10 MILLIGRAM(S): 10 TABLET, FILM COATED ORAL at 11:19

## 2022-11-01 RX ADMIN — CYCLOBENZAPRINE HYDROCHLORIDE 5 MILLIGRAM(S): 10 TABLET, FILM COATED ORAL at 22:30

## 2022-11-01 RX ADMIN — Medication 5 MILLIGRAM(S): at 22:30

## 2022-11-01 RX ADMIN — Medication 650 MILLIGRAM(S): at 03:23

## 2022-11-01 RX ADMIN — Medication 15 MILLIGRAM(S): at 06:05

## 2022-11-01 RX ADMIN — Medication 15 MILLIGRAM(S): at 05:35

## 2022-11-01 NOTE — CONSULT NOTE ADULT - SUBJECTIVE AND OBJECTIVE BOX
p (1480)     HPI:  62F, PMH known C- and L-spine degenerative spine disease. Follows with Ortho Spine Dr. Irving. P/w BLE paresthesias and allodynia x1d up to L2-3 dermatome (L>R) x1d after getting COVID booster 2 days ago. LLE feels like "lead balloon." Denies bowel/bladder dysfunction or weakness. MRIs on [facility]. 2020 MRI shows degen cervical stenosis from facet hypertrophy, worst at C3-4 with some cord signal change. 2015 MRI shows levoscoliosis w/ apex at L3. Exam: AOx3, EOMI, no facial/drift, BUE 5/5. LDF/PF 4++/5, otherwise BLE 5/5. LLE<RLE sensation.     =====================  PAST MEDICAL HISTORY   HLD (hyperlipidemia)    Stenosis, cervical spine    Synovial cyst of lumbar spine    Degenerative arthritis of cervical spine    Migraines    Asthma      PAST SURGICAL HISTORY     shellfish (Nausea)      MEDICATIONS:  Antibiotics:    Neuro:  escitalopram 10 milliGRAM(s) Oral daily    Other:      SOCIAL HISTORY:   Occupation:   Marital Status:     FAMILY HISTORY:      ROS: Negative except per HPI    LABS:                          13.6   6.00  )-----------( 168      ( 31 Oct 2022 20:27 )             40.3     10-31    134<L>  |  101  |  8   ----------------------------<  106<H>  4.5   |  21<L>  |  0.76    Ca    8.9      31 Oct 2022 20:27  Phos  2.4     10-31  Mg     1.9     10-31    TPro  6.8  /  Alb  4.0  /  TBili  0.4  /  DBili  x   /  AST  32  /  ALT  20  /  AlkPhos  85  10-31       p (1480)     HPI:  62F, PMH known C- and L-spine degenerative spine disease. Follows with Ortho Spine Dr. Irving. P/w BLE paresthesias and allodynia x1d up to L2-3 dermatome (L>R) x1d after getting COVID booster 2 days ago. LLE feels like "lead balloon." Denies bowel/bladder dysfunction or weakness. MRIs on [facility]. 2020 MRI shows degen cervical stenosis from facet hypertrophy, worst at C3-4 with some cord signal change. 2015 MRI shows levoscoliosis w/ apex at L3. Exam: AOx3, EOMI, no facial/drift, BUE 5/5. LDF/PF 4++/5, otherwise BLE 5/5. LLE<RLE sensation. No Patel's/clonus    =====================  PAST MEDICAL HISTORY   HLD (hyperlipidemia)    Stenosis, cervical spine    Synovial cyst of lumbar spine    Degenerative arthritis of cervical spine    Migraines    Asthma      PAST SURGICAL HISTORY     shellfish (Nausea)      MEDICATIONS:  Antibiotics:    Neuro:  escitalopram 10 milliGRAM(s) Oral daily    Other:      SOCIAL HISTORY:   Occupation:   Marital Status:     FAMILY HISTORY:      ROS: Negative except per HPI    LABS:                          13.6   6.00  )-----------( 168      ( 31 Oct 2022 20:27 )             40.3     10-31    134<L>  |  101  |  8   ----------------------------<  106<H>  4.5   |  21<L>  |  0.76    Ca    8.9      31 Oct 2022 20:27  Phos  2.4     10-31  Mg     1.9     10-31    TPro  6.8  /  Alb  4.0  /  TBili  0.4  /  DBili  x   /  AST  32  /  ALT  20  /  AlkPhos  85  10-31

## 2022-11-01 NOTE — PROGRESS NOTE ADULT - ASSESSMENT
63 y/o female with PMHx Migraines, Asthma, HLD, cervical spine stenosis, arthritis of the spine, lumbar synovial cyst presenting with pins/needles b/l LE. On Saturday AM, patient received COVID19 moderna booster and flue vaccine along with her . All day Saturday she felt fine and after dinner, she started feeling chills, muscle aches, off balance, GI issues.  felt the same way. On Sunday AM, patient woke up and felt pins and needles on her entire left foot. During the day on Sunday, patient felt like this sensation was radiating up her left leg and today, patient felt pins/needles was now widespread in b/l LE up to her waist. Denies numbness, pain, vision changes, bowel/bladder incontinence, difficulty raising eyelids, difficulty swallowing. EMG in the past, per patient, has been normal.     Of note, patient has an extensive hx of degenerative spine disease and cervical stenosis. Patient says last MRI c spine showed severe cervical spine stenosis for which orthopedic surgeon (Dr. Irving) said it is up to patient if she wants to do surgery. Patient will feel intermittent pins and needles in her hands along with weakness. Patient has also been complaining of dragging her left foot which has been progressing. She goes to physical therapy. Patient did endorse left sided pins/needles appear more noticeable than right sided and that with her spine disease, she has felt discomfort on her left side already.     Over the last 6 months, patient has had abdominal pain, work up revealed 7 cm right ovarian fibroid, pedunculated and necrotic in nature, removal scheduled Nov 17.     Impression: Subjective pins/needles with generalized spreading in no specific dermatomal distribution with no sensory deficit on exam with sx developing after vaccines and iso spine disease requiring ruling out transverse myelitis vs immune modulating disease. Less likely GBS given intact reflex and no weakness. Progression of spine disease unclear.     Recommendations:   []Admit to neurology floor   []MRI orbits, brain, thoracic, lumbar with and without contrast   [] elevated CRP (35), syphilis neg, ammonia/lactate/B12/TSH/ESR nl  [] check UA, Utox,, T3/T4, RPR, antithyroglobulin Ab, TPO Ab, vitamin B1, B6, folate, homocysteine, methylmalonic acid, creatnine kinase  [] fu NSG consult   [] PT evaluation  []q4 neurochecks and vitals   []DVT ppx: Lovenox 40 mg SQ    Case seen and discussed w/ attending Dr. Márquez

## 2022-11-01 NOTE — ED ADULT NURSE REASSESSMENT NOTE - NS ED NURSE REASSESS COMMENT FT1
Pt noted to be resting comfortably in stretcher, VSS, NAD noted. Pt is A&Ox3, speaking coherently, moving all extremities without difficulty, PERRL. Pt endorsing decreased sensation in LLE compared to RLE. Pt also endorsing "pins and needles" in LE b/l that radiates up the legs to her back. Pt denies headache, changes in vision, weakness, dizziness at this time. Plan of care discussed with pt and verbalizes understanding. Safety and comfort measures maintained.

## 2022-11-01 NOTE — CONSULT NOTE ADULT - ASSESSMENT
62F, PMH known C- and L-spine degenerative spine disease. Follows with Ortho Spine Dr. Irving. P/w BLE paresthesias and allodynia x1d up to L2-3 dermatome (L>R) x1d after getting COVID booster 2 days ago. LLE feels like "lead balloon." Denies bowel/bladder dysfunction or weakness. MRIs on [facility]. 2020 MRI shows degen cervical stenosis from facet hypertrophy, worst at C3-4 with some cord signal change. 2015 MRI shows levoscoliosis w/ apex at L3. Exam: AOx3, EOMI, no facial/drift, BUE 5/5. LDF/PF 4++/5, otherwise BLE 5/5. LLE<RLE sensation.   -Adm Neurology, q4h neurochecks, to eval for transverse myelitis  -MRI Neuraxis w/wo 62F, PMH known C- and L-spine degenerative spine disease. Follows with Ortho Spine Dr. Irving. P/w BLE paresthesias and allodynia x1d up to L2-3 dermatome (L>R) x1d after getting COVID booster 2 days ago. LLE feels like "lead balloon." Denies bowel/bladder dysfunction or weakness. MRIs on [facility]. 2020 MRI shows degen cervical stenosis from facet hypertrophy, worst at C3-4 with some cord signal change. 2015 MRI shows levoscoliosis w/ apex at L3. Exam: AOx3, EOMI, no facial/drift, BUE 5/5. LDF/PF 4++/5, otherwise BLE 5/5. LLE<RLE sensation. No Patel's/clonus  -Adm Neurology, q4h neurochecks, to eval for transverse myelitis  -MRI Neuraxis w/wo

## 2022-11-01 NOTE — PROGRESS NOTE ADULT - SUBJECTIVE AND OBJECTIVE BOX
Neurology Progress Note    SUBJECTIVE/OBJECTIVE/INTERVAL EVENTS: Patient seen and examined at bedside w/ neuro attending and team. She reports getting the covid and flu vaccines on Saturday morning (3 days ago). Starting that night, she developed tingling "pins and needles" sensation that starts in the b/l buttocks and radiates down to the feet. The skin in the legs is very sensitive to touch, such as from the clothes she is wearing. She is still able to feel the soles of her feet. She notes that her gait has been "shuffling" and she is afraid she might fall, so she started using a cane. At baseline, she has an active lifestyle and does yoga and pilates daily. She endorses chronic neck pain and acid reflux symptoms. She was prescribed Nexium 2 months ago; otherwise, no recent medication changes. Despite multifocal stenosis in the spine, she only had 1x episode of tingling in R hand 30y ago and heavy sensation in L foot. She endorses chronic constipation. No groin numbness or bladder changes.    INTERVAL HISTORY: elevated CRP, otherwise CBC and CMP unremarkable    REVIEW OF SYSTEMS: Otherwise denies fever, chills, headaches, vision changes, blurry vision, double vision, nausea, vomiting, hearing change, focal weakness, focal numbness, bowel/ bladder incontinence.  Few questions of a 10-system ROS was performed and is negative except for those items noted above and/or in the HPI.    VITALS & EXAMINATION:  Vital Signs Last 24 Hrs  T(C): 37 (01 Nov 2022 04:25), Max: 37 (01 Nov 2022 04:25)  T(F): 98.6 (01 Nov 2022 04:25), Max: 98.6 (01 Nov 2022 04:25)  HR: 58 (01 Nov 2022 04:25) (58 - 84)  BP: 98/57 (01 Nov 2022 04:25) (95/65 - 148/92)  BP(mean): 73 (01 Nov 2022 01:30) (73 - 92)  RR: 16 (01 Nov 2022 04:25) (16 - 18)  SpO2: 100% (01 Nov 2022 04:25) (97% - 100%)    Parameters below as of 01 Nov 2022 04:25  Patient On (Oxygen Delivery Method): room air        General:  Constitutional: Female, appears stated age   Head: Normocephalic; Eyes: clear sclera;   Extremities: No cyanosis; Skin: No marilee edema of LE  Resp: breathing comfortably     Neurological (>12):  MS: Awake, alert.  Follows commands. Attends to examiner  Language: Speech is clear, fluent, good repetition,  comprehension, registration of words.  CNs: PERRL (R 3mm, L 3mm). VFF. EOMI. V1-3 intact LT, No facial asymmetry b/l. Hearing grossly normal b/l. Tongue midline.     Motor - Normal bulk and tone throughout. No pronator drift. Decreased ROM in L plantar flexion compared to R.    L/R         Deltoid  5/5    Biceps   5/5      Triceps  5/5         Wrist Extension 5/5   Wrist Flexion  5/5      5/5   L/R         Hip Flexion  5/5    Hip Extension  5/5  Knee Extension  5/5  Dorsiflexion  5/5      Plantar Flexion 5/5     Sensation: Intact to LT b/l. Upon vibration testing, she is able to sense the stimulus for 20s in the R toe, 10s in the L toe, 16s in L knee, and 9s in R knee  Reflexes L/R:  Biceps(C5) 2/2  BR(C6) 2/2   Triceps(C7)  2/2 Patellar(L4)   3/3   Toes: mute  Coordination: No dysmetria to FTN b/l UE  Gait: Normal Romberg. No postural instability.     LABORATORY:  CBC                       13.6   6.12  )-----------( 193      ( 01 Nov 2022 07:05 )             40.6     Chem 11-01    139  |  104  |  11  ----------------------------<  103<H>  5.1   |  24  |  0.78    Ca    9.6      01 Nov 2022 07:05  Phos  2.4     11-01  Mg     2.2     11-01    TPro  6.9  /  Alb  4.3  /  TBili  0.4  /  DBili  x   /  AST  17  /  ALT  18  /  AlkPhos  81  11-01    LFTs LIVER FUNCTIONS - ( 01 Nov 2022 07:05 )  Alb: 4.3 g/dL / Pro: 6.9 g/dL / ALK PHOS: 81 U/L / ALT: 18 U/L / AST: 17 U/L / GGT: x           Coagulopathy   Lipid Panel   A1c   Cardiac enzymes     U/A   CSF  Immunological  Other    STUDIES & IMAGING: (EEG, CT, MR, U/S, TTE/KENNEDI): Neurology Progress Note    SUBJECTIVE/OBJECTIVE/INTERVAL EVENTS: Patient seen and examined at bedside w/ neuro attending and team. She reports getting the covid and flu vaccines on Saturday morning (3 days ago). Starting that night, she developed tingling "pins and needles" sensation that starts in the b/l buttocks and radiates down to the feet. The skin in the legs is very sensitive to touch, such as from the clothes she is wearing. She is still able to feel the soles of her feet. She notes that her gait has been "shuffling" and she is afraid she might fall, so she started using a cane. At baseline, she has an active lifestyle and does yoga and pilates daily. She endorses chronic neck pain and acid reflux symptoms. She was prescribed Nexium 2 months ago; otherwise, no recent medication changes. Despite multifocal stenosis in the spine, she only had 1x episode of tingling in R hand 30y ago and heavy sensation in L foot. She endorses chronic constipation. No groin numbness or bladder changes.    INTERVAL HISTORY: elevated CRP, otherwise CBC and CMP unremarkable    REVIEW OF SYSTEMS: Otherwise denies fever, chills, headaches, vision changes, blurry vision, double vision, nausea, vomiting, hearing change, focal weakness, focal numbness, bowel/ bladder incontinence.  Few questions of a 10-system ROS was performed and is negative except for those items noted above and/or in the HPI.    VITALS & EXAMINATION:  Vital Signs Last 24 Hrs  T(C): 37 (01 Nov 2022 04:25), Max: 37 (01 Nov 2022 04:25)  T(F): 98.6 (01 Nov 2022 04:25), Max: 98.6 (01 Nov 2022 04:25)  HR: 58 (01 Nov 2022 04:25) (58 - 84)  BP: 98/57 (01 Nov 2022 04:25) (95/65 - 148/92)  BP(mean): 73 (01 Nov 2022 01:30) (73 - 92)  RR: 16 (01 Nov 2022 04:25) (16 - 18)  SpO2: 100% (01 Nov 2022 04:25) (97% - 100%)    Parameters below as of 01 Nov 2022 04:25  Patient On (Oxygen Delivery Method): room air        General:  Constitutional: Female, appears stated age   Head: Normocephalic; Eyes: clear sclera;   Extremities: No cyanosis; Skin: No marilee edema of LE  Resp: breathing comfortably     Neurological (>12):  MS: Awake, alert.  Follows commands. Attends to examiner  Language: Speech is clear, fluent, good repetition,  comprehension, registration of words.  CNs: PERRL (R 3mm, L 3mm). VFF. EOMI. V1-3 intact LT, No facial asymmetry b/l. Hearing grossly normal b/l. Tongue midline.     Motor - Normal bulk and tone throughout. No pronator drift. Decreased ROM in L plantar flexion compared to R.    L/R         Deltoid  5/5    Biceps   5/5      Triceps  5/5         Wrist Extension 5/5   Wrist Flexion  5/5      5/5   L/R         Hip Flexion  5/5    Hip Extension  5/5  Knee Extension  5/5  Dorsiflexion  5/5      Plantar Flexion 5/5     Sensation: Intact to LT b/l. Upon vibration testing, she is able to sense the stimulus for 20s in the R toe, 10s in the L toe, 16s in L knee, and 9s in R knee  Reflexes L/R:  Biceps(C5) 2/2  BR(C6) 2/2   Triceps(C7)  2/2 Patellar(L4)   3/3   Toes: mute  Coordination: No dysmetria to FTN b/l UE  Gait: Normal Romberg. No postural instability. Intermittent stumbling w/ gait. Unable to perform tandem gait. Slowly completes tip-toe and on-heel gait.    LABORATORY:  CBC                       13.6   6.12  )-----------( 193      ( 01 Nov 2022 07:05 )             40.6     Chem 11-01    139  |  104  |  11  ----------------------------<  103<H>  5.1   |  24  |  0.78    Ca    9.6      01 Nov 2022 07:05  Phos  2.4     11-01  Mg     2.2     11-01    TPro  6.9  /  Alb  4.3  /  TBili  0.4  /  DBili  x   /  AST  17  /  ALT  18  /  AlkPhos  81  11-01    LFTs LIVER FUNCTIONS - ( 01 Nov 2022 07:05 )  Alb: 4.3 g/dL / Pro: 6.9 g/dL / ALK PHOS: 81 U/L / ALT: 18 U/L / AST: 17 U/L / GGT: x           STUDIES & IMAGING: (EEG, CT, MR, U/S, TTE/KENNEDI):  pending MR c/t/l spine

## 2022-11-01 NOTE — ED ADULT NURSE REASSESSMENT NOTE - NS ED NURSE REASSESS COMMENT FT1
Pt endorsing a "jumping pain in my left leg." Admitting team contacted at [id number]. Pt to receive tylenol PO.

## 2022-11-02 ENCOUNTER — TRANSCRIPTION ENCOUNTER (OUTPATIENT)
Age: 62
End: 2022-11-02

## 2022-11-02 VITALS
HEART RATE: 78 BPM | DIASTOLIC BLOOD PRESSURE: 74 MMHG | RESPIRATION RATE: 18 BRPM | TEMPERATURE: 99 F | OXYGEN SATURATION: 97 % | SYSTOLIC BLOOD PRESSURE: 124 MMHG

## 2022-11-02 LAB
ALBUMIN SERPL ELPH-MCNC: 4.2 G/DL — SIGNIFICANT CHANGE UP (ref 3.3–5)
ALP SERPL-CCNC: 74 U/L — SIGNIFICANT CHANGE UP (ref 40–120)
ALT FLD-CCNC: 15 U/L — SIGNIFICANT CHANGE UP (ref 10–45)
ANA PAT FLD IF-IMP: SIGNIFICANT CHANGE UP
ANA TITR SER: ABNORMAL
ANION GAP SERPL CALC-SCNC: 13 MMOL/L — SIGNIFICANT CHANGE UP (ref 5–17)
AST SERPL-CCNC: 14 U/L — SIGNIFICANT CHANGE UP (ref 10–40)
AUTO DIFF PNL BLD: ABNORMAL
BASOPHILS # BLD AUTO: 0.04 K/UL — SIGNIFICANT CHANGE UP (ref 0–0.2)
BASOPHILS NFR BLD AUTO: 0.6 % — SIGNIFICANT CHANGE UP (ref 0–2)
BILIRUB SERPL-MCNC: 0.4 MG/DL — SIGNIFICANT CHANGE UP (ref 0.2–1.2)
BUN SERPL-MCNC: 16 MG/DL — SIGNIFICANT CHANGE UP (ref 7–23)
C-ANCA SER-ACNC: NEGATIVE — SIGNIFICANT CHANGE UP
CALCIUM SERPL-MCNC: 9.3 MG/DL — SIGNIFICANT CHANGE UP (ref 8.4–10.5)
CHLORIDE SERPL-SCNC: 104 MMOL/L — SIGNIFICANT CHANGE UP (ref 96–108)
CO2 SERPL-SCNC: 21 MMOL/L — LOW (ref 22–31)
CREAT SERPL-MCNC: 0.91 MG/DL — SIGNIFICANT CHANGE UP (ref 0.5–1.3)
EGFR: 72 ML/MIN/1.73M2 — SIGNIFICANT CHANGE UP
EOSINOPHIL # BLD AUTO: 0.18 K/UL — SIGNIFICANT CHANGE UP (ref 0–0.5)
EOSINOPHIL NFR BLD AUTO: 2.9 % — SIGNIFICANT CHANGE UP (ref 0–6)
GLUCOSE SERPL-MCNC: 104 MG/DL — HIGH (ref 70–99)
HCT VFR BLD CALC: 40.7 % — SIGNIFICANT CHANGE UP (ref 34.5–45)
HGB BLD-MCNC: 14 G/DL — SIGNIFICANT CHANGE UP (ref 11.5–15.5)
IMM GRANULOCYTES NFR BLD AUTO: 0.3 % — SIGNIFICANT CHANGE UP (ref 0–0.9)
LYMPHOCYTES # BLD AUTO: 2.25 K/UL — SIGNIFICANT CHANGE UP (ref 1–3.3)
LYMPHOCYTES # BLD AUTO: 36.2 % — SIGNIFICANT CHANGE UP (ref 13–44)
MAGNESIUM SERPL-MCNC: 2.2 MG/DL — SIGNIFICANT CHANGE UP (ref 1.6–2.6)
MCHC RBC-ENTMCNC: 29.5 PG — SIGNIFICANT CHANGE UP (ref 27–34)
MCHC RBC-ENTMCNC: 34.4 GM/DL — SIGNIFICANT CHANGE UP (ref 32–36)
MCV RBC AUTO: 85.7 FL — SIGNIFICANT CHANGE UP (ref 80–100)
MONOCYTES # BLD AUTO: 0.64 K/UL — SIGNIFICANT CHANGE UP (ref 0–0.9)
MONOCYTES NFR BLD AUTO: 10.3 % — SIGNIFICANT CHANGE UP (ref 2–14)
NEUTROPHILS # BLD AUTO: 3.09 K/UL — SIGNIFICANT CHANGE UP (ref 1.8–7.4)
NEUTROPHILS NFR BLD AUTO: 49.7 % — SIGNIFICANT CHANGE UP (ref 43–77)
NRBC # BLD: 0 /100 WBCS — SIGNIFICANT CHANGE UP (ref 0–0)
P-ANCA SER-ACNC: NEGATIVE — SIGNIFICANT CHANGE UP
PHOSPHATE SERPL-MCNC: 3.7 MG/DL — SIGNIFICANT CHANGE UP (ref 2.5–4.5)
PLATELET # BLD AUTO: 202 K/UL — SIGNIFICANT CHANGE UP (ref 150–400)
POTASSIUM SERPL-MCNC: 4 MMOL/L — SIGNIFICANT CHANGE UP (ref 3.5–5.3)
POTASSIUM SERPL-SCNC: 4 MMOL/L — SIGNIFICANT CHANGE UP (ref 3.5–5.3)
PROT SERPL-MCNC: 6.8 G/DL — SIGNIFICANT CHANGE UP (ref 6–8.3)
RBC # BLD: 4.75 M/UL — SIGNIFICANT CHANGE UP (ref 3.8–5.2)
RBC # FLD: 12.5 % — SIGNIFICANT CHANGE UP (ref 10.3–14.5)
SODIUM SERPL-SCNC: 138 MMOL/L — SIGNIFICANT CHANGE UP (ref 135–145)
T PALLIDUM AB TITR SER: NEGATIVE — SIGNIFICANT CHANGE UP
WBC # BLD: 6.22 K/UL — SIGNIFICANT CHANGE UP (ref 3.8–10.5)
WBC # FLD AUTO: 6.22 K/UL — SIGNIFICANT CHANGE UP (ref 3.8–10.5)

## 2022-11-02 PROCEDURE — 70551 MRI BRAIN STEM W/O DYE: CPT | Mod: MA

## 2022-11-02 PROCEDURE — 86780 TREPONEMA PALLIDUM: CPT

## 2022-11-02 PROCEDURE — 85652 RBC SED RATE AUTOMATED: CPT

## 2022-11-02 PROCEDURE — 83921 ORGANIC ACID SINGLE QUANT: CPT

## 2022-11-02 PROCEDURE — 83090 ASSAY OF HOMOCYSTEINE: CPT

## 2022-11-02 PROCEDURE — 82140 ASSAY OF AMMONIA: CPT

## 2022-11-02 PROCEDURE — 86140 C-REACTIVE PROTEIN: CPT

## 2022-11-02 PROCEDURE — 85025 COMPLETE CBC W/AUTO DIFF WBC: CPT

## 2022-11-02 PROCEDURE — 97161 PT EVAL LOW COMPLEX 20 MIN: CPT

## 2022-11-02 PROCEDURE — 86618 LYME DISEASE ANTIBODY: CPT

## 2022-11-02 PROCEDURE — 84443 ASSAY THYROID STIM HORMONE: CPT

## 2022-11-02 PROCEDURE — 84207 ASSAY OF VITAMIN B-6: CPT

## 2022-11-02 PROCEDURE — 82550 ASSAY OF CK (CPK): CPT

## 2022-11-02 PROCEDURE — 84100 ASSAY OF PHOSPHORUS: CPT

## 2022-11-02 PROCEDURE — 82607 VITAMIN B-12: CPT

## 2022-11-02 PROCEDURE — 80053 COMPREHEN METABOLIC PANEL: CPT

## 2022-11-02 PROCEDURE — 83605 ASSAY OF LACTIC ACID: CPT

## 2022-11-02 PROCEDURE — 70543 MRI ORBT/FAC/NCK W/O &W/DYE: CPT | Mod: 26

## 2022-11-02 PROCEDURE — 72148 MRI LUMBAR SPINE W/O DYE: CPT | Mod: MA

## 2022-11-02 PROCEDURE — 83735 ASSAY OF MAGNESIUM: CPT

## 2022-11-02 PROCEDURE — 86038 ANTINUCLEAR ANTIBODIES: CPT

## 2022-11-02 PROCEDURE — 86036 ANCA SCREEN EACH ANTIBODY: CPT

## 2022-11-02 PROCEDURE — 85027 COMPLETE CBC AUTOMATED: CPT

## 2022-11-02 PROCEDURE — 84436 ASSAY OF TOTAL THYROXINE: CPT

## 2022-11-02 PROCEDURE — 86803 HEPATITIS C AB TEST: CPT

## 2022-11-02 PROCEDURE — 82390 ASSAY OF CERULOPLASMIN: CPT

## 2022-11-02 PROCEDURE — 72141 MRI NECK SPINE W/O DYE: CPT | Mod: MA

## 2022-11-02 PROCEDURE — U0003: CPT

## 2022-11-02 PROCEDURE — 84479 ASSAY OF THYROID (T3 OR T4): CPT

## 2022-11-02 PROCEDURE — 99232 SBSQ HOSP IP/OBS MODERATE 35: CPT

## 2022-11-02 PROCEDURE — 99285 EMERGENCY DEPT VISIT HI MDM: CPT

## 2022-11-02 PROCEDURE — 36415 COLL VENOUS BLD VENIPUNCTURE: CPT

## 2022-11-02 PROCEDURE — U0005: CPT

## 2022-11-02 PROCEDURE — 72146 MRI CHEST SPINE W/O DYE: CPT | Mod: MA

## 2022-11-02 PROCEDURE — 70543 MRI ORBT/FAC/NCK W/O &W/DYE: CPT

## 2022-11-02 PROCEDURE — 86738 MYCOPLASMA ANTIBODY: CPT

## 2022-11-02 PROCEDURE — 84425 ASSAY OF VITAMIN B-1: CPT

## 2022-11-02 RX ORDER — KETOROLAC TROMETHAMINE 30 MG/ML
15 SYRINGE (ML) INJECTION ONCE
Refills: 0 | Status: DISCONTINUED | OUTPATIENT
Start: 2022-11-02 | End: 2022-11-02

## 2022-11-02 RX ORDER — GABAPENTIN 400 MG/1
1 CAPSULE ORAL
Qty: 90 | Refills: 0
Start: 2022-11-02 | End: 2022-12-01

## 2022-11-02 RX ORDER — ACETAMINOPHEN 500 MG
650 TABLET ORAL EVERY 6 HOURS
Refills: 0 | Status: DISCONTINUED | OUTPATIENT
Start: 2022-11-02 | End: 2022-11-02

## 2022-11-02 RX ADMIN — Medication 15 MILLIGRAM(S): at 05:02

## 2022-11-02 RX ADMIN — Medication 650 MILLIGRAM(S): at 02:35

## 2022-11-02 RX ADMIN — Medication 650 MILLIGRAM(S): at 02:05

## 2022-11-02 RX ADMIN — Medication 15 MILLIGRAM(S): at 05:33

## 2022-11-02 RX ADMIN — ESCITALOPRAM OXALATE 10 MILLIGRAM(S): 10 TABLET, FILM COATED ORAL at 11:07

## 2022-11-02 NOTE — DISCHARGE NOTE PROVIDER - NSDCMRMEDTOKEN_GEN_ALL_CORE_FT
Breo Ellipta:   Lexapro 10 mg oral tablet: 1 tab(s) orally once a day  NexIUM 40 mg oral delayed release capsule: 1 cap(s) orally once a day  Xanax 0.25 mg oral tablet: 1 tab(s) orally once a day, As Needed   Breo Ellipta:   gabapentin 300 mg oral capsule: 1 cap(s) orally 3 times a day   Lexapro 10 mg oral tablet: 1 tab(s) orally once a day  Medrol Dosepak 4 mg oral tablet: As per dosepak instructions   NexIUM 40 mg oral delayed release capsule: 1 cap(s) orally once a day  outpatient physical therapy, at least 3x/week:   Xanax 0.25 mg oral tablet: 1 tab(s) orally once a day, As Needed   Breo Ellipta:   gabapentin 300 mg oral capsule: 1 cap(s) orally 3 times a day   Lexapro 10 mg oral tablet: 1 tab(s) orally once a day  Medrol Dosepak 4 mg oral tablet: As per dosepak instructions   NexIUM 40 mg oral delayed release capsule: 1 cap(s) orally once a day  Xanax 0.25 mg oral tablet: 1 tab(s) orally once a day, As Needed

## 2022-11-02 NOTE — PHYSICAL THERAPY INITIAL EVALUATION ADULT - PERTINENT HX OF CURRENT PROBLEM, REHAB EVAL
62F with PMHx of Migraines, Asthma, HLD, cervical spine stenosis, arthritis of the spine, lumbar synovial cyst; P/w BLE paresthesias and allodynia x1 day up to L2-3 dermatome (L>R), of note received COVID booster and flu vaccine 2 days prior. Pt reports LLE feels like "lead balloon." Denies bowel/bladder dysfunction or weakness. 2020 MRI shows degenerative cervical stenosis from facet hypertrophy, worst at C3-4 with some cord signal change. 2015 MRI shows levoscoliosis w/ apex at L3. Pt admitted to Neurology to r/o transverse myelitis vs immune modulating disease. Per Neuro less likely GBS given intact reflex and no weakness. MRI with similarly appearing C/L degenerative disease, MRI brain w/o diffusion restriction or obvious abnormalities. 62F with PMHx of Migraines, Asthma, HLD, cervical spine stenosis, arthritis of the spine, lumbar synovial cyst; P/w BLE paresthesias and allodynia x1 day up to L2-3 dermatome (L>R), of note received COVID booster and flu vaccine 2 days prior. Pt reports LLE feels like "lead balloon." Denies bowel/bladder dysfunction or weakness. 2020 MRI shows degenerative cervical stenosis from facet hypertrophy, worst at C3-4 with some cord signal change. 2015 MRI shows levoscoliosis w/ apex at L3. Pt admitted to Neurology to r/o transverse myelitis vs immune modulating disease. Per Neuro less likely GBS given intact reflex and no weakness. MRI with similarly appearing C/L degenerative disease, MRI brain w/o diffusion restriction or obvious abnormalities.    11/01/2022 MR BRAIN:  Unremarkable MR of the brain. No evidence of infarction. Ischemic white matter disease and atrophy typical for age. ORBITS: No mass lesion is appreciated. Optic nerves appear intact. 3. Gadolinium-enhanced images were not tolerated this patient at the time of imaging.  Completion gadolinium-enhanced images may be considered. MR CERVICAL SPINE: Widespread moderate cervical degenerative disc disease, most pronounced in the mid cervical spine. No high grade canal compromise. Reversal of the normal cervical lordosis suggestive of disc pathology and / or muscle spasm.  Cervical cord appears intact without focal intrinsic lesion. THORACIC SPINE: Widespread mild to moderate thoracic degenerative disc disease. No high-grade canal compromise. Thoracic cord appears intact without focal intrinsic lesion. LUMBAR SPINE: Widespread lumbar degenerative disc disease results in degenerative foraminal compromise greatest to the left at L5-S1 where synovial cyst contributes to compromise the ventrolateral recesses of the canal. Asymmetric fusiform thickening of the left S1 nerve root, suspect radiculopathy. Prominent lower lumbar facet arthropathy. No high-grade central canal stenosis. Distal cord and conus intact. 4. Gadolinium-enhanced images were not tolerated this patient at the time of imaging.  Completion gadolinium-enhanced images may be considered. 5. Structure in the right pelvis appears to deform the right dome of the bladder. This is incompletely evaluated, signal intensity pattern atypical for the uterus, possibly ovarian. Supplemental evaluation recommended such as by transvaginal pelvic ultrasound or pelvic MR.

## 2022-11-02 NOTE — DISCHARGE NOTE PROVIDER - NSDCCPTREATMENT_GEN_ALL_CORE_FT
PRINCIPAL PROCEDURE  Procedure: MRI LS spine  Findings and Treatment: 1. CERVICAL SPINE:   Widespread moderate cervical degenerative disc disease, most pronounced in the mid cervical spine. No high grade canal compromise. Reversal of the normal cervical lordosis suggestive of disc pathology and / or muscle spasm.  Cervical cord appears intact without focal intrinsic lesion  2. THORACIC SPINE:   Widespread mild to moderate thoracic degenerative disc disease. No high-grade canal compromise. Thoracic cord appears intact without focal intrinsic lesion  3. LUMBAR SPINE:   Widespread lumbar degenerative disc disease results in degenerative foraminal compromise greatestto the left at L5-S1 where synovial cyst contributes to compromise the ventrolateral recesses of the canal. Asymmetric fusiform thickening of the left S1 nerve root, suspect radiculopathy. Prominent lower lumbar facet arthropathy. No high-gradecentral canal stenosis. Distal cord and conus intact  4. Gadolinium-enhanced images were not tolerated this patient at the time of imaging.  Completion gadolinium-enhanced images may be considered.  5. Structure in the right pelvis appears todeform the right dome of the bladder. This is incompletely evaluated, signal intensity pattern   atypical for the uterus, possibly ovarian. Supplemental evaluation   recommended such as by transvaginal pelvic ultrasound or pelvic MR         PRINCIPAL PROCEDURE  Procedure: MRI LS spine  Findings and Treatment: 1. CERVICAL SPINE:   Widespread moderate cervical degenerative disc disease, most pronounced in the mid cervical spine. No high grade canal compromise. Reversal of the normal cervical lordosis suggestive of disc pathology and / or muscle spasm.  Cervical cord appears intact without focal intrinsic lesion  2. THORACIC SPINE:   Widespread mild to moderate thoracic degenerative disc disease. No high-grade canal compromise. Thoracic cord appears intact without focal intrinsic lesion  3. LUMBAR SPINE:   Widespread lumbar degenerative disc disease results in degenerative foraminal compromise greatestto the left at L5-S1 where synovial cyst contributes to compromise the ventrolateral recesses of the canal. Asymmetric fusiform thickening of the left S1 nerve root, suspect radiculopathy. Prominent lower lumbar facet arthropathy. No high-gradecentral canal stenosis. Distal cord and conus intact  4. Gadolinium-enhanced images were not tolerated this patient at the time of imaging.  Completion gadolinium-enhanced images may be considered.  5. Structure in the right pelvis appears todeform the right dome of the bladder. This is incompletely evaluated, signal intensity pattern   atypical for the uterus, possibly ovarian. Supplemental evaluation   recommended such as by transvaginal pelvic ultrasound or pelvic MR        SECONDARY PROCEDURE  Procedure: MR brain  Findings and Treatment: 1. BRAIN: Unremarkable MR of the brain. No evidence of infarction. Ischemic white matter disease and atrophy typical for age.  2. ORBITS: No mass lesion is appreciated. Optic nerves appear intact.

## 2022-11-02 NOTE — DISCHARGE NOTE NURSING/CASE MANAGEMENT/SOCIAL WORK - NSDCPEFALRISK_GEN_ALL_CORE
For information on Fall & Injury Prevention, visit: [email or web address] OR  [email or web address] OR  https://www.cdc.gov/steadi/patient.html

## 2022-11-02 NOTE — DISCHARGE NOTE PROVIDER - PROVIDER TOKENS
PROVIDER:[TOKEN:[[id number]],FOLLOWUP:[2 weeks],ESTABLISHEDPATIENT:[T]],PROVIDER:[TOKEN:[[id number]],FOLLOWUP:[2 weeks],ESTABLISHEDPATIENT:[T]]

## 2022-11-02 NOTE — DISCHARGE NOTE PROVIDER - CARE PROVIDERS DIRECT ADDRESSES
,DirectAddress_Unknown,[email or web address]

## 2022-11-02 NOTE — PATIENT PROFILE ADULT - FALL HARM RISK - HARM RISK INTERVENTIONS

## 2022-11-02 NOTE — PROGRESS NOTE ADULT - ASSESSMENT
61 y/o female with PMHx Migraines, Asthma, HLD, cervical spine stenosis, arthritis of the spine, lumbar synovial cyst presenting with pins/needles b/l LE. On Saturday AM, patient received COVID19 moderna booster and flue vaccine along with her . All day Saturday she felt fine and after dinner, she started feeling chills, muscle aches, off balance, GI issues.  felt the same way. On Sunday AM, patient woke up and felt pins and needles on her entire left foot. During the day on Sunday, patient felt like this sensation was radiating up her left leg and today, patient felt pins/needles was now widespread in b/l LE up to her waist. Denies numbness, pain, vision changes, bowel/bladder incontinence, difficulty raising eyelids, difficulty swallowing. EMG in the past, per patient, has been normal.     Of note, patient has an extensive hx of degenerative spine disease and cervical stenosis. Patient says last MRI c spine showed severe cervical spine stenosis for which orthopedic surgeon (Dr. Irving) said it is up to patient if she wants to do surgery. Patient will feel intermittent pins and needles in her hands along with weakness. Patient has also been complaining of dragging her left foot which has been progressing. She goes to physical therapy. Patient did endorse left sided pins/needles appear more noticeable than right sided and that with her spine disease, she has felt discomfort on her left side already.     Over the last 6 months, patient has had abdominal pain, work up revealed 7 cm right ovarian fibroid, pedunculated and necrotic in nature, removal scheduled Nov 17.     Impression: Subjective pins/needles with generalized spreading in no specific dermatomal distribution with no sensory deficit on exam with sx developing after vaccines and iso spine disease requiring ruling out transverse myelitis vs immune modulating disease. Less likely GBS given intact reflex and no weakness. Progression of spine disease unclear.     Recommendations:   []Admit to neurology floor   [] fu MRI orbits, brain, thoracic, lumbar with and without contrast   [] elevated CRP (35), syphilis neg, ammonia/lactate/B12/TSH/ESR nl  [] check UA, Utox,, T3/T4, RPR, antithyroglobulin Ab, TPO Ab, vitamin B1, B6, folate, homocysteine, methylmalonic acid, creatnine kinase  [] fu NSG consult   [] PT evaluation  []q4 neurochecks and vitals   [] DVT ppx: Lovenox 40 mg SQ    Case seen and discussed w/ attending Dr. Márquez 61 y/o female with PMHx Migraines, Asthma, HLD, cervical spine stenosis, arthritis of the spine, lumbar synovial cyst presenting with pins/needles b/l LE. On Saturday AM, patient received COVID19 moderna booster and flue vaccine along with her . All day Saturday she felt fine and after dinner, she started feeling chills, muscle aches, off balance, GI issues.  felt the same way. On Sunday AM, patient woke up and felt pins and needles on her entire left foot. During the day on Sunday, patient felt like this sensation was radiating up her left leg and today, patient felt pins/needles was now widespread in b/l LE up to her waist. Denies numbness, pain, vision changes, bowel/bladder incontinence, difficulty raising eyelids, difficulty swallowing. EMG in the past, per patient, has been normal.     Of note, patient has an extensive hx of degenerative spine disease and cervical stenosis. Patient says last MRI c spine showed severe cervical spine stenosis for which orthopedic surgeon (Dr. Irving) said it is up to patient if she wants to do surgery. Patient will feel intermittent pins and needles in her hands along with weakness. Patient has also been complaining of dragging her left foot which has been progressing. She goes to physical therapy. Patient did endorse left sided pins/needles appear more noticeable than right sided and that with her spine disease, she has felt discomfort on her left side already.     Over the last 6 months, patient has had abdominal pain, work up revealed 7 cm right ovarian fibroid, pedunculated and necrotic in nature, removal scheduled Nov 17.     Impression: Subjective pins/needles with generalized spreading in no specific dermatomal distribution with no sensory deficit on exam with sx developing after vaccines and iso spine disease requiring ruling out transverse myelitis vs immune modulating disease. Less likely GBS given intact reflex and no weakness. Progression of spine disease unclear.     Recommendations:   []Admit to neurology floor   [] s/p MRI orbits, brain, thoracic, lumbar with and without contrast   [] elevated CRP (35), syphilis neg, ammonia/lactate/B12/TSH/ESR nl  [] check UA, Utox,, T3/T4, RPR, antithyroglobulin Ab, TPO Ab, vitamin B1, B6, folate, homocysteine, methylmalonic acid, creatnine kinase  [] d/c w/ gabapentin 300mg daily (can be increased after 1w each to BID and then TID per patient's response), Medrol dose pack, outpatient PT, neuro fu for NCS, and neurosurg fu w/ Dr. Irving  [] NSG following, appreciate their recs  [] PT evaluation  []q4 neurochecks and vitals   [] DVT ppx: Lovenox 40 mg SQ    Case seen and discussed w/ attending Dr. Márquez

## 2022-11-02 NOTE — DISCHARGE NOTE PROVIDER - HOSPITAL COURSE
HPI:  63 y/o female with PMHx Migraines, Asthma, HLD, cervical spine stenosis, arthritis of the spine, lumbar synovial cyst presenting with pins/needles b/l LE. On Saturday AM, patient received COVID19 moderna booster and flue vaccine along with her . All day Saturday she felt fine and after dinner, she started feeling chills, muscle aches, off balance, GI issues.  felt the same way. On Sunday AM, patient woke up and felt pins and needles on her entire left foot. During the day on Sunday, patient felt like this sensation was radiating up her left leg and today, patient felt pins/needles was now widespread in b/l LE up to her waist. Denies numbness, pain, vision changes, bowel/bladder incontinence, difficulty raising eyelids, difficulty swallowing. EMG in the past, per patient, has been normal.     Of note, patient has an extensive hx of degenerative spine disease and cervical stenosis. Patient says last MRI c spine showed severe cervical spine stenosis for which orthopedic surgeon (Dr. Irving) said it is up to patient if she wants to do surgery. Patient will feel intermittent pins and needles in her hands along with weakness. Patient has also been complaining of dragging her left foot which has been progressing. She goes to physical therapy. Patient did endorse left sided pins/needles appear more noticeable than right sided and that with her spine disease, she has felt discomfort on her left side already.     Over the last 6 months, patient has had abdominal pain, work up revealed 7 cm right ovarian fibroid, pedunculated and necrotic in nature, removal scheduled Nov 17.     Hospital Course:  MRI brain and orbits, MR C/T/L spine completed without contrast, pt was unable to tolerate IV ocontrast. HPI:  63 y/o female with PMHx Migraines, Asthma, HLD, cervical spine stenosis, arthritis of the spine, lumbar synovial cyst presenting with pins/needles b/l LE. On Saturday AM, patient received COVID19 moderna booster and flue vaccine along with her . All day Saturday she felt fine and after dinner, she started feeling chills, muscle aches, off balance, GI issues.  felt the same way. On Sunday AM, patient woke up and felt pins and needles on her entire left foot. During the day on Sunday, patient felt like this sensation was radiating up her left leg and today, patient felt pins/needles was now widespread in b/l LE up to her waist. Denies numbness, pain, vision changes, bowel/bladder incontinence, difficulty raising eyelids, difficulty swallowing. EMG in the past, per patient, has been normal.     Of note, patient has an extensive hx of degenerative spine disease and cervical stenosis. Patient says last MRI c spine showed severe cervical spine stenosis for which orthopedic surgeon (Dr. Irving) said it is up to patient if she wants to do surgery. Patient will feel intermittent pins and needles in her hands along with weakness. Patient has also been complaining of dragging her left foot which has been progressing. She goes to physical therapy. Patient did endorse left sided pins/needles appear more noticeable than right sided and that with her spine disease, she has felt discomfort on her left side already.     Over the last 6 months, patient has had abdominal pain, work up revealed 7 cm right ovarian fibroid, pedunculated and necrotic in nature, removal scheduled Nov 17.     Hospital Course:  MRI brain and orbits, MR C/T/L spine completed without contrast, pt was unable to tolerate IV ocontrast. She was discharged on gabapentin and Medrol to provide her w/ relief of symptoms. She was also given a prescription for outpatient physical therapy to continue building her strength. She was instructed to follow-up w/ neurology (will need NCS) and spine surgery in clinic. She remained afebrile, hemodynamically stable, and was dicharged to home. HPI:  61 y/o female with PMHx Migraines, Asthma, HLD, cervical spine stenosis, arthritis of the spine, lumbar synovial cyst presenting with pins/needles b/l LE. On Saturday AM, patient received COVID19 moderna booster and flue vaccine along with her . All day Saturday she felt fine and after dinner, she started feeling chills, muscle aches, off balance, GI issues.  felt the same way. On Sunday AM, patient woke up and felt pins and needles on her entire left foot. During the day on Sunday, patient felt like this sensation was radiating up her left leg and today, patient felt pins/needles was now widespread in b/l LE up to her waist. Denies numbness, pain, vision changes, bowel/bladder incontinence, difficulty raising eyelids, difficulty swallowing. EMG in the past, per patient, has been normal.     Of note, patient has an extensive hx of degenerative spine disease and cervical stenosis. Patient says last MRI c spine showed severe cervical spine stenosis for which orthopedic surgeon (Dr. Irving) said it is up to patient if she wants to do surgery. Patient will feel intermittent pins and needles in her hands along with weakness. Patient has also been complaining of dragging her left foot which has been progressing. She goes to physical therapy. Patient did endorse left sided pins/needles appear more noticeable than right sided and that with her spine disease, she has felt discomfort on her left side already.     Over the last 6 months, patient has had abdominal pain, work up revealed 7 cm right ovarian fibroid, pedunculated and necrotic in nature, removal scheduled Nov 17.     Hospital Course:  MRI brain and orbits, MR C/T/L spine completed without contrast, pt was unable to tolerate IV ocontrast. She was discharged on gabapentin and Medrol to provide her w/ relief of symptoms. She was also given a prescription for outpatient physical therapy to continue building her strength. She was instructed to follow-up w/ neurology (will need NCS) and spine surgery in clinic. Patient wanted to leave prior to receiving final neurosurgery recs. She remained afebrile, hemodynamically stable, and was dicharged to home.

## 2022-11-02 NOTE — PROGRESS NOTE ADULT - SUBJECTIVE AND OBJECTIVE BOX
Patient seen and examined at bedside.    --Anticoagulation--    T(C): 36.8 (11-02-22 @ 04:55), Max: 36.9 (11-01-22 @ 20:35)  HR: 76 (11-02-22 @ 04:55) (60 - 76)  BP: 106/60 (11-02-22 @ 04:55) (105/70 - 121/70)  RR: 18 (11-02-22 @ 04:55) (16 - 18)  SpO2: 96% (11-02-22 @ 04:55) (96% - 99%)  Wt(kg): --    Exam: AOx3, EOMI, no facial/drift, BUE 5/5. LDF/PF 4++/5, otherwise BLE 5/5. LLE<RLE sensation. No Patel's/clonus

## 2022-11-02 NOTE — PHYSICAL THERAPY INITIAL EVALUATION ADULT - LIVES WITH, PROFILE
Pt reports shes living with spouse in private home with flight of stairs inside with unilateral HR. Prior to admission pt independent with all functional mobility including ambulation without AD. Prior to admission pt independent with all functional mobility including ambulation with straight cane./spouse

## 2022-11-02 NOTE — PATIENT PROFILE ADULT - DOES PATIENT HAVE ADVANCE DIRECTIVE
JESSI KING  43y, Female  Allergy: contrast media (iodine-based) (Unknown)  iodine (Anaphylaxis)  peanuts (Unknown)  shellfish (Anaphylaxis)  shellfish (Unknown)      LOS  2d    CHIEF COMPLAINT: SOB (31 Oct 2020 13:01)      INTERVAL EVENTS/HPI  - No acute events overnight  - T(F): , Max: 97.4 (10-31-20 @ 06:04)  - Denies any worsening symptoms  - Tolerating medication  - WBC Count: 16.95 (10-30-20 @ 08:35)  WBC Count: 16.05 (10-29-20 @ 07:08)     - Creatinine, Serum: 0.6 (10-30-20 @ 08:35)       ROS  General: Denies rigors, nightsweats  HEENT: Denies headache, rhinorrhea, sore throat, eye pain  CV: Denies CP, palpitations  PULM: Denies wheezing, hemoptysis  GI: Denies hematemesis, hematochezia, melena  : Denies discharge, hematuria  MSK: Denies arthralgias, myalgias  SKIN: Denies rash, lesions  NEURO: Denies paresthesias, weakness  PSYCH: Denies depression, anxiety    VITALS:  T(F): 97.2, Max: 97.4 (10-31-20 @ 06:04)  HR: 95  BP: 115/63  RR: 20Vital Signs Last 24 Hrs  T(C): 36.2 (31 Oct 2020 14:45), Max: 36.3 (31 Oct 2020 06:04)  T(F): 97.2 (31 Oct 2020 14:45), Max: 97.4 (31 Oct 2020 06:04)  HR: 95 (31 Oct 2020 14:45) (80 - 95)  BP: 115/63 (31 Oct 2020 14:45) (115/63 - 133/87)  BP(mean): --  RR: 20 (31 Oct 2020 06:04) (18 - 20)  SpO2: --    PHYSICAL EXAM:  Gen: NAD, resting in bed  HEENT: Normocephalic, atraumatic  Neck: supple, no lymphadenopathy  CV: Regular rate & regular rhythm  Lungs: decreased BS at bases, no fremitus  Abdomen: Soft, BS present  Ext: Warm, well perfused  Neuro: non focal, awake  Skin: no rash, no erythema  Lines: no phlebitis    FH: Non-contributory  Social Hx: Non-contributory    TESTS & MEASUREMENTS:                        11.4   16.95 )-----------( 440      ( 30 Oct 2020 08:35 )             36.7     10-30    136  |  104  |  15  ----------------------------<  113<H>  5.0   |  23  |  0.6<L>    Ca    8.7      30 Oct 2020 08:35              Culture - Blood (collected 10-29-20 @ 07:08)  Source: .Blood None  Gram Stain (10-30-20 @ 02:03):    Growth in anaerobic bottle: Gram Positive Cocci in Clusters  Final Report (10-31-20 @ 15:33):    Growth in anaerobic bottle: Staphylococcus aureus    "Due to technical problems, Proteus sp. will Not be reported as part of    the BCID panel until further notice"    ***Blood Panel PCR results on this specimen are available    approximately 3 hours afterthe Gram stain result.***    Gram stain, PCR, and/or culture results may not always    correspond due to difference in methodologies.    ************************************************************    This PCR assay was performed using [employer].    The following targets are tested for: Enterococcus,    vancomycin resistant enterococci, Listeria monocytogenes,    coagulase negative staphylococci, S. aureus,    methicillin resistant S. aureus, Streptococcus agalactiae    (Group B), S. pneumoniae, S. pyogenes (Group A),    Acinetobacter baumannii, Enterobacter cloacae, E. coli,    Klebsiella oxytoca, K. pneumoniae, Proteus sp.,    Serratia marcescens, Haemophilus influenzae,    Neisseria meningitidis, Pseudomonas aeruginosa, Candida    albicans, C. glabrata, C krusei, C parapsilosis,    C. tropicalis and the KPC resistance gene.  Organism: Blood Culture PCR  Staphylococcus aureus (10-31-20 @ 15:33)  Organism: Staphylococcus aureus (10-31-20 @ 15:33)      -  Ampicillin/Sulbactam: S <=8/4      -  Cefazolin: S <=4      -  Clindamycin: S <=0.25      -  Erythromycin: S 0.5      -  Gentamicin: S <=1 Should not be used as monotherapy      -  Oxacillin: S <=0.25      -  Penicillin: R 4      -  RIF- Rifampin: S <=1 Should not be used as monotherapy      -  Tetra/Doxy: S <=1      -  Trimethoprim/Sulfamethoxazole: S <=0.5/9.5      -  Vancomycin: S 2      Method Type: CONNER  Organism: Blood Culture PCR (10-31-20 @ 15:33)      -  Staphylococcus aureus: Detec Any isolate of Staphylococcus aureus from a blood culture is NOT considered a contaminant.      Method Type: PCR    Culture - Blood (collected 10-29-20 @ 01:50)  Source: .Blood Blood  Preliminary Report (10-30-20 @ 07:02):    No growth to date.    Culture - Urine (collected 10-29-20 @ 00:40)  Source: .Urine Clean Catch (Midstream)  Final Report (10-30-20 @ 06:49):    <10,000 CFU/mL Normal Urogenital Alejandra        Blood Gas Venous - Lactate: 3.2 mmoL/L (10-29-20 @ 00:27)      INFECTIOUS DISEASES TESTING  Procalcitonin, Serum: 0.03 (10-29-20 @ 07:08)  COVID-19 PCR: NotDetec (10-29-20 @ 01:36)  Procalcitonin, Serum: 0.03 (01-22-20 @ 06:26)  Rapid RVP Result: NotDetec (01-22-20 @ 02:12)      INFLAMMATORY MARKERS  Sedimentation Rate, Erythrocyte: 39 mm/Hr (10-30-20 @ 20:00)  C-Reactive Protein, Serum: <0.10 mg/dL (10-30-20 @ 20:00)  Sedimentation Rate, Erythrocyte: 41 mm/Hr (10-29-20 @ 07:08)  C-Reactive Protein, Serum: 0.20 mg/dL (10-29-20 @ 07:08)      RADIOLOGY & ADDITIONAL TESTS:  I have personally reviewed the last available Chest xray  CXR      CT  CT Angio Chest PE Protocol w/ IV Cont:   EXAM:  CT ANGIO CHEST PE PROTOCOL IC            PROCEDURE DATE:  10/30/2020            INTERPRETATION:  CLINICAL INDICATION: Shortness of breath    TECHNIQUE:  CT of the thorax was performed after administration of contrast. Sagittal and coronal reformats were performed as well as MIP reconstructions.  Intravenous contrast: 100 cc Optiray-320    COMPARISON: CT 1/22/2020    INTERPRETATION:    AIRWAYS, LUNGS AND PLEURA: The central tracheobronchial tree is patent. There are low lung volumes. Redemonstrated are peripheral predominant opacities bilaterally with bilateral lower lobe multisegmental atelectasis. There is stable focal opacity within the posterior medial left upper lobe. There is no pleural effusion. There is no pneumothorax.    MEDIASTINUM: There are no enlarged mediastinal, hilar or axillary lymph nodes. The visualized portion of the thyroid gland is heterogeneous.    HEART AND VESSELS: The right atrium and ventricle appear enlarged. There is no pericardial effusion. There areno filling defects in the main pulmonary artery or segmental branches to suggest pulmonary embolus. The main pulmonary artery is dilated, which can be seen with pulmonary arterial hypertension.    UPPER ABDOMEN: Images of the upper abdomen demonstrate no abnormality.    BONES AND SOFT TISSUES: There are degenerative changes of the spine.  The soft tissues are unremarkable.    IMPRESSION:  No central pulmonary embolism.    Enlarged right atrium and ventricle as well as dilated main pulmonary artery which can be seen with pulmonary arterial hypertension.    Low lung volume. Bilateral peripheral predominant opacities as well as multisegmental atelectasis within the bilateral lower lobes without significant change since CT 1/22/2020. Differentialincludes nonspecific interstitial lung disease and organizing pneumonia.                    JAVIER CHIANG MD; Attending Radiologist  This document has been electronically signed. Oct 30 2020 10:30AM (10-30-20 @ 09:51)      CARDIOLOGY TESTING  12 Lead ECG:   Ventricular Rate 100 BPM    Atrial Rate 100 BPM    P-R Interval 146 ms    QRS Duration 82 ms    Q-T Interval 330 ms    QTC Calculation(Bazett) 425 ms    P Axis 39 degrees    R Axis -11 degrees    T Axis 30 degrees    Diagnosis Line Normal sinus rhythm  Possible Left atrial enlargement  Left ventricular hypertrophy  Abnormal ECG    Confirmed by Scott Mosqueda ([id number]) on 10/29/2020 12:52:55 PM (10-29-20 @ 09:50)  12 Lead ECG:   Ventricular Rate 97 BPM    Atrial Rate 97 BPM    P-R Interval 138 ms    QRS Duration 74 ms    Q-T Interval 328 ms    QTC Calculation(Bazett) 416 ms    P Axis 31 degrees    R Axis -12 degrees    T Axis 22 degrees    Diagnosis Line Normal sinus rhythm  Possible Left atrial enlargement  Left ventricular hypertrophy  Abnormal ECG    Confirmed by Scott Mosqueda ([id number]) on 10/29/2020 7:15:20 AM (10-29-20 @ 00:26)      MEDICATIONS  ALBUTerol    90 MICROgram(s) HFA Inhaler 2 Inhalation daily  azaTHIOprine 150 Oral daily  budesonide 160 MICROgram(s)/formoterol 4.5 MICROgram(s) Inhaler 2 Inhalation two times a day  calcium carbonate 1250 mG  + Vitamin D (OsCal 500 + D) 1 Oral daily  ceFAZolin   IVPB 2000 IV Intermittent every 8 hours  chlorhexidine 4% Liquid 1 Topical <User Schedule>  enoxaparin Injectable 40 SubCutaneous daily  melatonin 10 Oral at bedtime  mycophenolate mofetil  Oral Tab/Cap - Peds 500 Oral two times a day  pantoprazole    Tablet 40 Oral before breakfast  predniSONE   Tablet 20 Oral daily  trimethoprim  160 mG/sulfamethoxazole 800 mG 1 Oral <User Schedule>  valACYclovir 1000 Oral daily      WEIGHT  Weight (kg): 115 (10-29-20 @ 15:46)      ANTIBIOTICS:  ceFAZolin   IVPB 2000 milliGRAM(s) IV Intermittent every 8 hours  trimethoprim  160 mG/sulfamethoxazole 800 mG 1 Tablet(s) Oral <User Schedule>  valACYclovir 1000 milliGRAM(s) Oral daily      All available historical records have been reviewed       Yes

## 2022-11-02 NOTE — DISCHARGE NOTE NURSING/CASE MANAGEMENT/SOCIAL WORK - PATIENT PORTAL LINK FT
You can access the FollowMyHealth Patient Portal offered by [facility] by registering at the following website: [email or web address]. By joining [facility] [employer]’s FollowMyHealth portal, you will also be able to view your health information using other applications (apps) compatible with our system.

## 2022-11-02 NOTE — PATIENT PROFILE ADULT - FUNCTIONAL ASSESSMENT - BASIC MOBILITY 6.
4-calculated by average/Not able to assess (calculate score using [facility] averaging method)

## 2022-11-02 NOTE — DISCHARGE NOTE PROVIDER - CARE PROVIDER_API CALL
Shakira Márquez)  Neurology  [address] [place] [address]  Phone: [phone]  Fax: [phone]  Established Patient  Follow Up Time: 2 weeks    Nathan Irving)  Orthopaedic Surgery  [address] [place] [address]  Phone: [phone]  Fax: [phone]  Established Patient  Follow Up Time: 2 weeks

## 2022-11-02 NOTE — PROGRESS NOTE ADULT - SUBJECTIVE AND OBJECTIVE BOX
Neurology Progress Note    SUBJECTIVE/OBJECTIVE/INTERVAL EVENTS: Patient seen and examined at bedside w/ neuro attending and team.     INTERVAL HISTORY:    REVIEW OF SYSTEMS: Otherwise denies fever, chills, headaches, vision changes, blurry vision, double vision, nausea, vomiting, hearing change, focal weakness, focal numbness, parasthesias, bowel/ bladder incontinence.  Few questions of a 10-system ROS was performed and is negative except for those items noted above and/or in the HPI.    VITALS & EXAMINATION:  Vital Signs Last 24 Hrs  T(C): 36.8 (02 Nov 2022 04:55), Max: 36.9 (01 Nov 2022 20:35)  T(F): 98.3 (02 Nov 2022 04:55), Max: 98.5 (01 Nov 2022 20:35)  HR: 76 (02 Nov 2022 04:55) (60 - 76)  BP: 106/60 (02 Nov 2022 04:55) (105/70 - 121/70)  BP(mean): --  RR: 18 (02 Nov 2022 04:55) (16 - 18)  SpO2: 96% (02 Nov 2022 04:55) (96% - 99%)    Parameters below as of 02 Nov 2022 04:55  Patient On (Oxygen Delivery Method): room air        General: INCOMPLETE  Constitutional: Female, appears stated age   Head: Normocephalic; Eyes: clear sclera;   Extremities: No cyanosis; Skin: No marilee edema of LE  Resp: breathing comfortably     Neurological (>12):  MS: Awake, alert.  Follows commands. Attends to examiner  Language: Speech is clear, fluent, good repetition,  comprehension, registration of words.  CNs: PERRL (R 3mm, L 3mm). VFF. EOMI. V1-3 intact LT, No facial asymmetry b/l. Hearing grossly normal b/l. Tongue midline.     Motor - Normal bulk and tone throughout. No pronator drift.    L/R         Deltoid  5/5    Biceps   5/5      Triceps  5/5         Wrist Extension 5/5   Wrist Flexion  5/5      5/5   L/R         Hip Flexion  5/5    Hip Extension  5/5  Knee Extension  5/5  Dorsiflexion  5/5      Plantar Flexion 5/5     Sensation: Intact to LT b/l. Cortical: Extinction on DSS (neglect): none  Reflexes L/R:  Biceps(C5) 2/2  BR(C6) 2/2   Triceps(C7)  2/2 Patellar(L4)   2/2   Toes: mute  Coordination: No dysmetria to FTN b/l UE  Gait: Normal Romberg. No postural instability. Normal stance.    LABORATORY:  CBC                       13.6   6.12  )-----------( 193      ( 01 Nov 2022 07:05 )             40.6     Chem 11-01    139  |  104  |  11  ----------------------------<  103<H>  5.1   |  24  |  0.78    Ca    9.6      01 Nov 2022 07:05  Phos  2.4     11-01  Mg     2.2     11-01    TPro  6.9  /  Alb  4.3  /  TBili  0.4  /  DBili  x   /  AST  17  /  ALT  18  /  AlkPhos  81  11-01    LFTs LIVER FUNCTIONS - ( 01 Nov 2022 07:05 )  Alb: 4.3 g/dL / Pro: 6.9 g/dL / ALK PHOS: 81 U/L / ALT: 18 U/L / AST: 17 U/L / GGT: x           Coagulopathy   Lipid Panel   A1c   Cardiac enzymes CARDIAC MARKERS ( 01 Nov 2022 07:05 )  x     / x     / 58 U/L / x     / x          U/A   CSF  Immunological  Other    STUDIES & IMAGING: (EEG, CT, MR, U/S, TTE/KENNEDI): Neurology Progress Note    SUBJECTIVE/OBJECTIVE/INTERVAL EVENTS: Patient seen and examined at bedside w/ neuro attending and team. She is still having sensitivity to light touch on the b/l feet and this was making her uncomfortable overnight. It can feel like "stab wounds." We discussed the medication changes and outpatient plan; she was agreeable to going home.    INTERVAL HISTORY: MR roly/t/l spine completed, see below for results    REVIEW OF SYSTEMS: Otherwise denies fever, chills, headaches, vision changes, blurry vision, double vision, nausea, vomiting, hearing change, bowel/ bladder incontinence.  Few questions of a 10-system ROS was performed and is negative except for those items noted above and/or in the HPI.    VITALS & EXAMINATION:  Vital Signs Last 24 Hrs  T(C): 36.8 (02 Nov 2022 04:55), Max: 36.9 (01 Nov 2022 20:35)  T(F): 98.3 (02 Nov 2022 04:55), Max: 98.5 (01 Nov 2022 20:35)  HR: 76 (02 Nov 2022 04:55) (60 - 76)  BP: 106/60 (02 Nov 2022 04:55) (105/70 - 121/70)  BP(mean): --  RR: 18 (02 Nov 2022 04:55) (16 - 18)  SpO2: 96% (02 Nov 2022 04:55) (96% - 99%)    Parameters below as of 02 Nov 2022 04:55  Patient On (Oxygen Delivery Method): room air        General:  Constitutional: Female, appears stated age   Head: Normocephalic; Eyes: clear sclera;   Extremities: No cyanosis; Skin: No marilee edema of LE  Resp: breathing comfortably     Neurological (>12):  MS: Awake, alert.  Follows commands. Attends to examiner  Language: Speech is clear, fluent, good repetition,  comprehension, registration of words.  CNs: PERRL (R 3mm, L 3mm). VFF. EOMI. V1-3 intact LT, No facial asymmetry b/l. Hearing grossly normal b/l. Tongue midline.     Motor - Normal bulk and tone throughout. No pronator drift.    L/R         Deltoid  5/5    Biceps   5/5      Triceps  5/5         Wrist Extension 5/5   Wrist Flexion  5/5      5/5   L/R         Hip Flexion  5/5    Hip Extension  5/5  Knee Extension  5/5  Dorsiflexion  5/5      Plantar Flexion 5/5     Sensation: Intact to LT b/l. Cortical: Extinction on DSS (neglect): none  Reflexes L/R:  Biceps(C5) 2/2  BR(C6) 2/2   Triceps(C7)  2/2 Patellar(L4)   3/3   Toes: mute  Coordination: No dysmetria to FTN b/l UE  Gait: normal casual gait w/ cane    LABORATORY:  CBC                       13.6   6.12  )-----------( 193      ( 01 Nov 2022 07:05 )             40.6     Chem 11-01    139  |  104  |  11  ----------------------------<  103<H>  5.1   |  24  |  0.78    Ca    9.6      01 Nov 2022 07:05  Phos  2.4     11-01  Mg     2.2     11-01    TPro  6.9  /  Alb  4.3  /  TBili  0.4  /  DBili  x   /  AST  17  /  ALT  18  /  AlkPhos  81  11-01    LFTs LIVER FUNCTIONS - ( 01 Nov 2022 07:05 )  Alb: 4.3 g/dL / Pro: 6.9 g/dL / ALK PHOS: 81 U/L / ALT: 18 U/L / AST: 17 U/L / GGT: x           Coagulopathy   Lipid Panel   A1c   Cardiac enzymes CARDIAC MARKERS ( 01 Nov 2022 07:05 )  x     / x     / 58 U/L / x     / x          STUDIES & IMAGING: (EEG, CT, MR, U/S, TTE/KENNEDI):  MR c/t/l spine-  1. CERVICAL SPINE:   Widespread moderate cervical degenerative disc   disease, most pronounced in the mid cervical spine. No high grade canal   compromise. Reversal of the normal cervical lordosis suggestive of disc   pathology and / or muscle spasm.  Cervical cord appears intact without   focal intrinsic lesion    2. THORACIC SPINE:   Widespread mild to moderate thoracic degenerative   disc disease. No high-grade canal compromise. Thoracic cord appears   intact without focal intrinsic lesion    3. LUMBAR SPINE:   Widespread lumbar degenerative disc disease results in   degenerative foraminal compromise greatestto the left at L5-S1 where   synovial cyst contributes to compromise the ventrolateral recesses of the   canal. Asymmetric fusiform thickening of the left S1 nerve root, suspect   radiculopathy. Prominent lower lumbar facet arthropathy. No high-grade  central canal stenosis. Distal cord and conus intact    4. Gadolinium-enhanced images were not tolerated this patient at the time   of imaging.  Completion gadolinium-enhanced images may be considered.    5. Structure in the right pelvis appears todeform the right dome of the   bladder. This is incompletely evaluated, signal intensity pattern   atypical for the uterus, possibly ovarian. Supplemental evaluation   recommended such as by transvaginal pelvic ultrasound or pelvic MR

## 2022-11-02 NOTE — PROGRESS NOTE ADULT - ASSESSMENT
62F, PMH known C- and L-spine degenerative spine disease. Follows with Ortho Spine Dr. Irving. P/w BLE paresthesias and allodynia x1d up to L2-3 dermatome (L>R) x1d after getting COVID booster 2 days ago. LLE feels like "lead balloon." Denies bowel/bladder dysfunction or weakness. MRIs on [facility]. 2020 MRI shows degen cervical stenosis from facet hypertrophy, worst at C3-4 with some cord signal change. 2015 MRI shows levoscoliosis w/ apex at L3. Exam: AOx3, EOMI, no facial/drift, BUE 5/5. LDF/PF 4++/5, otherwise BLE 5/5. LLE<RLE sensation. No Patel's/clonus    -Adm Neurology, q4h neurochecks, to eval for transverse myelitis  -MRI Neuraxis completed- similarly appearing C/L degenerative disease, MRI brain w/o diffusion restriction or obvious abnormalities. To be reviewed with attending.

## 2022-11-02 NOTE — PHYSICAL THERAPY INITIAL EVALUATION ADULT - GROSSLY INTACT, SENSORY
Grossly Intact Pt with tender, swollen posterior L knee today, no fever, dyspnea.  Exam: pulses intact, +varicose/inflammed vein posterior knee, no cords.

## 2022-11-02 NOTE — DISCHARGE NOTE PROVIDER - NSDCCPCAREPLAN_GEN_ALL_CORE_FT
PRINCIPAL DISCHARGE DIAGNOSIS  Diagnosis: Paresthesias  Assessment and Plan of Treatment: You came to the hospital because of tingling/pain in the legs and also difficulty w/ walking. We obtained MR scans of your brain and spine. We saw degenerative changes throughout your spine. There is significant narrowing of the spine at the L5/S1 level, which explains your symptoms. We have prescribed gabapentin and Medrol to give you some relief. We also recommend working w/ physical therapy. Please follow-up in our neurology (Dr. áMrquez) and surgery clinics (Dr. Irving) in 2-3 weeks. Return to the hospital if your symptoms worsen.

## 2022-11-03 ENCOUNTER — NON-APPOINTMENT (OUTPATIENT)
Age: 62
End: 2022-11-03

## 2022-11-03 PROBLEM — G43.909 MIGRAINE, UNSPECIFIED, NOT INTRACTABLE, WITHOUT STATUS MIGRAINOSUS: Chronic | Status: ACTIVE | Noted: 2022-10-31

## 2022-11-03 PROBLEM — M71.38 OTHER BURSAL CYST, OTHER SITE: Chronic | Status: ACTIVE | Noted: 2022-10-31

## 2022-11-03 PROBLEM — M47.812 SPONDYLOSIS WITHOUT MYELOPATHY OR RADICULOPATHY, CERVICAL REGION: Chronic | Status: ACTIVE | Noted: 2022-10-31

## 2022-11-03 PROBLEM — J45.909 UNSPECIFIED ASTHMA, UNCOMPLICATED: Chronic | Status: ACTIVE | Noted: 2022-10-31

## 2022-11-03 PROBLEM — M48.02 SPINAL STENOSIS, CERVICAL REGION: Chronic | Status: ACTIVE | Noted: 2022-10-31

## 2022-11-03 PROBLEM — E78.5 HYPERLIPIDEMIA, UNSPECIFIED: Chronic | Status: ACTIVE | Noted: 2022-10-31

## 2022-11-03 LAB
HOMOCYSTEINE LEVEL: 7.7 UMOL/L — SIGNIFICANT CHANGE UP
METHYLMALONIC ACID LEVEL: 109 NMOL/L — SIGNIFICANT CHANGE UP (ref 87–318)

## 2022-11-04 LAB
M PNEUMO IGG SER IA-ACNC: 3.67 INDEX — HIGH (ref 0–0.9)
M PNEUMO IGG SER IA-ACNC: 3.81 INDEX — HIGH (ref 0–0.9)
M PNEUMO IGG SER IA-ACNC: POSITIVE
M PNEUMO IGG SER IA-ACNC: POSITIVE
VIT B1 SERPL-MCNC: 137.9 NMOL/L — SIGNIFICANT CHANGE UP (ref 66.5–200)

## 2022-11-06 LAB — METHYLMALONATE SERPL-SCNC: 166 NMOL/L — SIGNIFICANT CHANGE UP (ref 0–378)

## 2022-11-08 LAB — PYRIDOXAL PHOS SERPL-MCNC: 4 UG/L — SIGNIFICANT CHANGE UP (ref 3.4–65.2)

## 2022-11-09 ENCOUNTER — APPOINTMENT (OUTPATIENT)
Dept: ORTHOPEDIC SURGERY | Facility: CLINIC | Age: 62
End: 2022-11-09

## 2022-11-09 ENCOUNTER — NON-APPOINTMENT (OUTPATIENT)
Age: 62
End: 2022-11-09

## 2022-11-09 VITALS — DIASTOLIC BLOOD PRESSURE: 66 MMHG | SYSTOLIC BLOOD PRESSURE: 102 MMHG | HEART RATE: 64 BPM

## 2022-11-09 PROCEDURE — 99214 OFFICE O/P EST MOD 30 MIN: CPT

## 2022-11-09 RX ORDER — IBUPROFEN 600 MG/1
600 TABLET, FILM COATED ORAL
Qty: 40 | Refills: 0 | Status: ACTIVE | COMMUNITY
Start: 2022-10-31

## 2022-11-09 RX ORDER — ESCITALOPRAM OXALATE 10 MG/1
10 TABLET ORAL
Qty: 90 | Refills: 0 | Status: ACTIVE | COMMUNITY
Start: 2022-10-31

## 2022-11-09 RX ORDER — FLUTICASONE FUROATE AND VILANTEROL TRIFENATATE 200; 25 UG/1; UG/1
200-25 POWDER RESPIRATORY (INHALATION)
Qty: 180 | Refills: 0 | Status: ACTIVE | COMMUNITY
Start: 2022-10-19

## 2022-11-09 RX ORDER — ESOMEPRAZOLE MAGNESIUM 40 MG/1
40 CAPSULE, DELAYED RELEASE ORAL
Qty: 90 | Refills: 0 | Status: ACTIVE | COMMUNITY
Start: 2022-10-28

## 2022-11-09 RX ORDER — ACETAMINOPHEN 325 MG/1
325 TABLET ORAL
Qty: 60 | Refills: 0 | Status: ACTIVE | COMMUNITY
Start: 2022-10-31

## 2022-12-27 ENCOUNTER — APPOINTMENT (OUTPATIENT)
Dept: NEUROLOGY | Facility: CLINIC | Age: 62
End: 2022-12-27
Payer: COMMERCIAL

## 2022-12-27 VITALS
HEART RATE: 78 BPM | DIASTOLIC BLOOD PRESSURE: 74 MMHG | SYSTOLIC BLOOD PRESSURE: 119 MMHG | WEIGHT: 135 LBS | HEIGHT: 62 IN | BODY MASS INDEX: 24.84 KG/M2

## 2022-12-27 PROCEDURE — 99215 OFFICE O/P EST HI 40 MIN: CPT

## 2022-12-27 RX ORDER — NALOXONE HYDROCHLORIDE 4 MG/.1ML
4 SPRAY NASAL
Qty: 2 | Refills: 0 | Status: DISCONTINUED | COMMUNITY
Start: 2022-10-31 | End: 2022-12-27

## 2022-12-27 RX ORDER — NEOMYCIN SULFATE, POLYMYXIN B SULFATE AND DEXAMETHASONE 3.5; 10000; 1 MG/ML; [USP'U]/ML; MG/ML
3.5-10000-0.1 SUSPENSION OPHTHALMIC
Qty: 5 | Refills: 0 | Status: DISCONTINUED | COMMUNITY
Start: 2022-07-21 | End: 2022-12-27

## 2022-12-27 NOTE — HISTORY OF PRESENT ILLNESS
[FreeTextEntry1] : HPI (initial visit Dec 27, 2022)- JOSE HERNÁNDEZ is a 62 year old woman with hx of degenerative spine disease with cervical spine stenosis (followed by Orthopedic, Dr. Irving), Migraine headaches, Asthma, right\par uterine fibroid (s/p recent hysterectomy 11/17) was admitted to [facility] 10/31/22-11/2/22 for new\par onset ascending lower extremity paresthesia’s and gait instability since 10/29/2022, after receiving vaccinations for COVID and the Flu. She was admitted to rule out possible GBS vs transverse myelitis. \par \par She reports symptoms began within 24 hours of receiving the vaccination. She reported waking up with pins and needles over entire left foot and this spread up to her left leg and then later also felt paresthesias in RLE. She denied any weakness, though walking was difficult 2/2 to the pain. No B/B issues. Symptoms worse over left leg. \par \par Admission exam: Neurological exam with no motor weakness in UE or LE. Slight increased tone at the ankles (L>R). Reflexes 2+ in UE and LE, thought relatively brisk R biceps compared to left biceps. No ty, no cross adductors, no clonus. Plantar reflex down going on right and mute on the left. Sensations reduced to pinprick\par over left leg (over shin), though intact over foot. Vibration sensation intact in UE and asymmetric in LE (R toe 10 seconds, L toe 20 seconds, R knee 16 seconds, L knee 9 seconds). Abnormal gait, drags feet, slightly spastic\par appearing. Cannot tandem walk. Romberg is absent.\par \par During admission, MRI scans of neuroaxis were ordered and are detailed below. Patient could not tolerate contrast studies.\par MRI brain showed non specific subcortical white matter lesions, likely 2/2 to\par hx of migraine headaches. No concerning pathology in brain.\par MRI cervical spine with moderate degenerative disc disease and normal spinal\par cord signal (seems overall stable compared to prior scan at [facility])\par MRI thoracic spine with mild-mod degenerative disc disease and normal spinal\par cord signal.\par MRI lumbar spine with wide spread degenerative disc disease, with neural\par foraminal narrowing worse at left L5/S1 (there is also a synovial cyst at this\par level that narrows the canal). Distal cord and conus intact.\par \par \par Given the absence of cord signal changes on MRI, transverse myelitis was considered unlikely. Additionally, given intact reflexes and normal strength exam, GBS was also considered unlikely. Her symptoms did improve slightly in the hospital with starting gabapentin. Hence, given findings noted on lumbar spine with degenerative changes, she was diagnosed with lumbar radiculopathy. She was discharged on medrol dose pack and gabapentin. Outpatient PT and Ortho follow up. \par \par She continues on gabapentin 300 mg TID, tolerating well, though continues to experience constant tingling in both her legs (L>R). She denies any numbness. Feels like rubber band around feet. No weakness. Balance good. Good control over bowel and bladder. She reports she can function fully, though the sensations are bothersome. In the left leg she feels the sensations up to her knees and up to ankles in R leg. She also reports occasional "Sciatica" in RLE. No significant lower back pain. She also reports occasional sharp pains over shoulder, more so with movements of her neck.\par \par

## 2022-12-27 NOTE — PHYSICAL EXAM
[FreeTextEntry1] : PHYSICAL EXAM\par Constitutional: Alert, no acute distress \par Psychiatric: appropriate affect and mood\par Pulmonary: No respiratory distress, stable on room air\par \par NEUROLOGICAL EXAM\par Mental status: The patient is alert, attentive, and conversational memory intact\par Speech/language: Clear and fluent with intact comprehension\par Cranial nerves:\par CN II: Visual fields are full to confrontation. Pupil size equal and briskly reactive to light. \par CN III, IV, VI: EOMI, no nystagmus, no ptosis\par CN V: Facial sensation is intact to pinprick in all 3 divisions bilaterally.\par CN VII: Face is symmetric with normal eye closure and smile.\par CN VII: Hearing is normal to rubbing fingers\par CN IX, X: Palate elevates symmetrically. Phonation is normal.\par CN XI: Head turning and shoulder shrug are intact\par CN XII: Tongue is midline with normal movements and no atrophy.\par Motor: Strength is full bilaterally. 5/5 muscle power in bilateral UE and LE.\par Reflexes: 2+ and symmetric reflexes throughout, though relatively hyporeflexic RLE reflexes. Plantar responses are flexor.\par Sensory: Intact sensations to light touch/pinprick/temperature/joint proprioception in upper and lower extremities. Vibratory sensations UE > LE, and LLE > RLE\par Vibration sense: 10 seconds right toe and 14 seconds at right knee\par                             21 seconds left toe and knee\par                            27 seconds hands\par Coordination: Rapid alternating movements and fine finger movements are intact. There is no dysmetria on finger-to-nose. There are no abnormal or extraneous movements. \par Gait/Stance: Posture is normal. Gait is steady with normal steps, base, arm swing, and turning. Heel and toe walking are normal. Tandem gait is normal. Romberg is absent.\par \par \par \par \par

## 2022-12-27 NOTE — DATA REVIEWED
[de-identified] : MRI brain 11/2022 showed non specific subcortical white matter lesions, likely 2/2 to\par hx of migraine headaches. No concerning pathology in brain.\par \par MR C/T/L with/without contrast spine 11/1/2022-\par "1. CERVICAL SPINE: Widespread moderate cervical degenerative disc\par disease, most pronounced in the mid cervical spine. No high grade canal\par compromise. Reversal of the normal cervical lordosis suggestive of disc\par pathology and / or muscle spasm. Cervical cord appears intact without\par focal intrinsic lesion.\par \par \par \par Reviewed MRI cervical spine images/report from [facility] 8/20/2020-\par multilevel cervical spondylosis with posterior osteophytes indenting the\par anterior aspect of thecal sac most marked at the C3-4 and C5-6 levels with AP\par diameter spinal canal measures 7 mm. Moderate severe left C3-4, severe bilateral C4-5, severe bilateral C5-6 and mild bilateral C6-7 foraminal stenosis. No cord signal abnormality.\par \par \par 2. THORACIC SPINE: Widespread mild to moderate thoracic degenerative\par disc disease. No high-grade canal compromise. Thoracic cord appears\par intact without focal intrinsic lesion\par \par 3. LUMBAR SPINE: Widespread lumbar degenerative disc disease results in\par degenerative foraminal compromise greatestto the left at L5-S1 where\par synovial cyst contributes to compromise the ventrolateral recesses of the\par canal. Asymmetric fusiform thickening of the left S1 nerve root, suspect\par radiculopathy. Prominent lower lumbar facet arthropathy. No high-grade\par central canal stenosis. Distal cord and conus intact\par \par 4. Gadolinium-enhanced images were not tolerated this patient at the time\par of imaging. Completion gadolinium-enhanced images may be considered.\par \par 5. Structure in the right pelvis appears todeform the right dome of the\par bladder. This is incompletely evaluated, signal intensity pattern\par atypical for the uterus, possibly ovarian. Supplemental evaluation\par recommended such as by transvaginal pelvic ultrasound or pelvic MR"\par \par  [de-identified] : normal EMG/NCS 11/2020

## 2022-12-27 NOTE — ASSESSMENT
[FreeTextEntry1] : Assessment/Plan:\par  62 year old female w/ hx of degenerative cervical and lumbar disc disease with new onset paresthesias in bilateral LE (L>R) after receiving COVID/FLU vaccinations 10/28/2022. Her symptoms have overall been stable. She was admitted to [facility] 10/2022 for further work up and MRI imaging of neuroaxis revealed findings consistent with degenerative changes in cervical and lumbar spine, but without any evidence of myelitis/myelopathy. Neurological exam with no motor weakness, intact reflexes (though relatively hyporeflexic in RLE) and reduced vibratory sense in RLE, otherwise intact sensations to other sensory modalities throughout.\par \par Paresthesias in b/l LE (L>R)- suspect possible radiculopathy. However, given that symptoms began after receiving  the COVID-19 vaccination, cannot completely rule out an autoimmune peripheral neuropathy.\par \par Plan:-\par [] Will refer for EMG/NCS to rule out peripheral neuropathy vs radiculopathy\par [] Will order neuropathy labs\par [] Continue gabapentin, currently on 300 mg TID, can increase by 300 mg weekly till on 600 mg TID\par [] Will refer to physical therapy\par \par Most radiculopathy symptoms improve/resolve with conservative treatment-example: rest,  anti-inflammatory medications, physical therapy, massage therapy, heating pads, and avoiding activity that strains the neck or back. Symptoms often improve within 6 weeks to 3 months. Patient was advised to avoid lifting or moving heavy objects. If radiculopathy symptoms persist despite conservative treatments, patients may benefit from an epidural steroid injection (JAYDEN), which reduces the inflammation and irritation of the nerve. Last resort would be surgical options.\par \par \par Return to clinic 3 months\par \par The above plan was discussed with JOSE HERNÁNDEZ in great detail.  JOSE HERNÁNDEZ verbalized understanding and agrees with plan as detailed above. Patient was provided education and counselling on current diagnosis/symptoms, diagnostic work up, treatment options and potential side effects of any prescribed therapy/therapies. She was advised to call our clinic at [phone] for any new or worsening symptoms, or with any questions or concerns. In case of acute onset of neurological symptoms or worsening presentation, patient was advised to present to nearest emergency room for further evaluation. JOSE HERNÁNDEZ expressed understanding and all her questions/concerns were addressed.\par \par Shakira Márquez M.D\par

## 2022-12-28 LAB
ACE BLD-CCNC: 47 U/L
ANA SER IF-ACNC: NEGATIVE
B BURGDOR AB SER-IMP: NEGATIVE
B BURGDOR IGG+IGM SER QL: 0.43 INDEX
CRP SERPL-MCNC: <3 MG/L
DEPRECATED KAPPA LC FREE/LAMBDA SER: 1.85 RATIO
DSDNA AB SER-ACNC: <12 IU/ML
ENA SS-A AB SER IA-ACNC: <0.2 AL
ENA SS-B AB SER IA-ACNC: <0.2 AL
ERYTHROCYTE [SEDIMENTATION RATE] IN BLOOD BY WESTERGREN METHOD: < 2 MM/HR
ESTIMATED AVERAGE GLUCOSE: 108 MG/DL
FOLATE SERPL-MCNC: 19.9 NG/ML
HBA1C MFR BLD HPLC: 5.4 %
HCYS SERPL-MCNC: 7.2 UMOL/L
KAPPA LC CSF-MCNC: 0.88 MG/DL
KAPPA LC SERPL-MCNC: 1.63 MG/DL
RHEUMATOID FACT SER QL: <10 IU/ML
VIT B12 SERPL-MCNC: 835 PG/ML

## 2022-12-30 LAB
ALBUMIN MFR SERPL ELPH: 67.4 %
ALBUMIN SERPL-MCNC: 4.2 G/DL
ALBUMIN/GLOB SERPL: 2.1 RATIO
ALPHA1 GLOB MFR SERPL ELPH: 3.6 %
ALPHA1 GLOB SERPL ELPH-MCNC: 0.2 G/DL
ALPHA2 GLOB MFR SERPL ELPH: 9 %
ALPHA2 GLOB SERPL ELPH-MCNC: 0.6 G/DL
ASIALO-GM1 ANTIBODIES, IGG/IGM: 10 IV
B-GLOBULIN MFR SERPL ELPH: 9.9 %
B-GLOBULIN SERPL ELPH-MCNC: 0.6 G/DL
CRYOGLOB SERPL-MCNC: NEGATIVE
GAMMA GLOB FLD ELPH-MCNC: 0.6 G/DL
GAMMA GLOB MFR SERPL ELPH: 10.1 %
GD1A ANTIBODIES, IGG/IGM: NORMAL IV
GD1B ANTIBODIES, IGG/IGM: 10 IV
GM1 ANTIBODIES, IGG/IGM: 12 IV
GM2 ANTIBODIES, IGG/IGM: 33 IV
GQ1B ANTIBODIES, IGG/IGM: 8 IV
INTERPRETATION SERPL IEP-IMP: NORMAL
M PROTEIN SPEC IFE-MCNC: NORMAL
METHYLMALONATE SERPL-SCNC: 128 NMOL/L
PROT SERPL-MCNC: 6.2 G/DL
PROT SERPL-MCNC: 6.2 G/DL
VIT B6 SERPL-MCNC: 14.9 UG/L

## 2023-01-03 LAB
COPPER SERPL-MCNC: 112 UG/DL
ZINC SERPL-MCNC: 86 UG/DL

## 2023-01-09 ENCOUNTER — TRANSCRIPTION ENCOUNTER (OUTPATIENT)
Age: 63
End: 2023-01-09

## 2023-01-09 LAB — PARANEOPLASTIC AB PNL SER: NORMAL

## 2023-02-27 ENCOUNTER — LABORATORY RESULT (OUTPATIENT)
Age: 63
End: 2023-02-27

## 2023-03-04 LAB
ANA PAT FLD IF-IMP: ABNORMAL
ANA SER IF-ACNC: ABNORMAL
APPEARANCE: CLEAR
BILIRUBIN URINE: NEGATIVE
BLOOD URINE: NEGATIVE
C3 SERPL-MCNC: 108 MG/DL
C4 SERPL-MCNC: 32 MG/DL
CK SERPL-CCNC: 64 U/L
COLOR: COLORLESS
CREAT SPEC-SCNC: 13 MG/DL
CREAT/PROT UR: NORMAL RATIO
DSDNA AB SER-ACNC: <12 IU/ML
ENA RNP AB SER IA-ACNC: 0.2 AL
ENA SM AB SER IA-ACNC: <0.2 AL
ENA SS-A AB SER IA-ACNC: <0.2 AL
ENA SS-B AB SER IA-ACNC: <0.2 AL
GLUCOSE QUALITATIVE U: NEGATIVE
KETONES URINE: NEGATIVE
LEUKOCYTE ESTERASE URINE: ABNORMAL
NITRITE URINE: NEGATIVE
PH URINE: 7
PROT UR-MCNC: <4 MG/DL
PROTEIN URINE: NEGATIVE
SPECIFIC GRAVITY URINE: 1
THYROGLOB AB SERPL-ACNC: <20 IU/ML
THYROPEROXIDASE AB SERPL IA-ACNC: <10 IU/ML
TSH SERPL-ACNC: 0.51 UIU/ML
UROBILINOGEN URINE: NORMAL

## 2023-03-23 ENCOUNTER — APPOINTMENT (OUTPATIENT)
Dept: NEUROLOGY | Facility: CLINIC | Age: 63
End: 2023-03-23

## 2023-04-05 ENCOUNTER — NON-APPOINTMENT (OUTPATIENT)
Age: 63
End: 2023-04-05

## 2023-04-27 ENCOUNTER — APPOINTMENT (OUTPATIENT)
Dept: NEUROLOGY | Facility: CLINIC | Age: 63
End: 2023-04-27

## 2023-04-27 ENCOUNTER — APPOINTMENT (OUTPATIENT)
Dept: RHEUMATOLOGY | Facility: CLINIC | Age: 63
End: 2023-04-27
Payer: COMMERCIAL

## 2023-04-27 VITALS
BODY MASS INDEX: 24.84 KG/M2 | SYSTOLIC BLOOD PRESSURE: 110 MMHG | TEMPERATURE: 98.1 F | DIASTOLIC BLOOD PRESSURE: 60 MMHG | HEART RATE: 72 BPM | WEIGHT: 135 LBS | OXYGEN SATURATION: 98 % | HEIGHT: 62 IN

## 2023-04-27 DIAGNOSIS — Z62.819 PERSONAL HISTORY OF UNSPECIFIED ABUSE IN CHILDHOOD: ICD-10-CM

## 2023-04-27 DIAGNOSIS — K58.2 MIXED IRRITABLE BOWEL SYNDROME: ICD-10-CM

## 2023-04-27 DIAGNOSIS — U09.9 POST COVID-19 CONDITION, UNSPECIFIED: ICD-10-CM

## 2023-04-27 PROCEDURE — 99205 OFFICE O/P NEW HI 60 MIN: CPT

## 2023-04-28 PROBLEM — Z62.819 HISTORY OF ABUSE IN CHILDHOOD: Status: ACTIVE | Noted: 2023-04-28
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
## 2023-04-28 NOTE — PHYSICAL EXAM
[General Appearance - Alert] : alert [General Appearance - In No Acute Distress] : in no acute distress [Sclera] : the sclera and conjunctiva were normal [PERRL With Normal Accommodation] : pupils were equal in size, round, and reactive to light [Extraocular Movements] : extraocular movements were intact [Outer Ear] : the ears and nose were normal in appearance [Oropharynx] : the oropharynx was normal [Neck Appearance] : the appearance of the neck was normal [Neck Cervical Mass (___cm)] : no neck mass was observed [Jugular Venous Distention Increased] : there was no jugular-venous distention [Thyroid Diffuse Enlargement] : the thyroid was not enlarged [Thyroid Nodule] : there were no palpable thyroid nodules [Respiration, Rhythm And Depth] : normal respiratory rhythm and effort [Auscultation Breath Sounds / Voice Sounds] : lungs were clear to auscultation bilaterally [Heart Rate And Rhythm] : heart rate was normal and rhythm regular [Heart Sounds] : normal S1 and S2 [Heart Sounds Gallop] : no gallops [Murmurs] : no murmurs [Heart Sounds Pericardial Friction Rub] : no pericardial rub [Edema] : there was no peripheral edema [Full Pulse] : the pedal pulses are present [Bowel Sounds] : normal bowel sounds [Abdomen Soft] : soft [Abdomen Tenderness] : non-tender [Abdomen Mass (___ Cm)] : no abdominal mass palpated [Cervical Lymph Nodes Enlarged Posterior Bilaterally] : posterior cervical [Cervical Lymph Nodes Enlarged Anterior Bilaterally] : anterior cervical [Supraclavicular Lymph Nodes Enlarged Bilaterally] : supraclavicular [No CVA Tenderness] : no ~M costovertebral angle tenderness [No Spinal Tenderness] : no spinal tenderness [Abnormal Walk] : normal gait [Nail Clubbing] : no clubbing  or cyanosis of the fingernails [Musculoskeletal - Swelling] : no joint swelling seen [Motor Tone] : muscle strength and tone were normal [Skin Turgor] : normal skin turgor [Skin Color & Pigmentation] : normal skin color and pigmentation [Deep Tendon Reflexes (DTR)] : deep tendon reflexes were 2+ and symmetric [Sensation] : the sensory exam was normal to light touch and pinprick [No Focal Deficits] : no focal deficits [General Appearance - Well Nourished] : well nourished [General Appearance - Well Developed] : well developed [General Appearance - Well-Appearing] : healthy appearing [Examination Of The Oral Cavity] : the lips and gums were normal [Nasal Cavity] : the nasal mucosa and septum were normal [] : the neck was supple [FreeTextEntry1] : Initial PSD 14, PHQ 6, HARPAL 2
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
## 2023-04-28 NOTE — REVIEW OF SYSTEMS
[Feeling Tired] : feeling tired [Abdominal Pain] : abdominal pain [Constipation] : constipation [Diarrhea] : diarrhea [Joint Pain] : joint pain [Sleep Disturbances] : sleep disturbances [Depression] : depression [Negative] : Heme/Lymph [FreeTextEntry7] : and nausea; describes IBS symptoms- bloating abd pain  [FreeTextEntry9] : mild in shoulders [de-identified] : paresthesia in bl LE worse on LLE involving entire leg.. NO weakness all sensory  [de-identified] : controlled on Lexapro; main sleep disturbances is 's snoring and cats

## 2023-04-28 NOTE — REASON FOR VISIT
[Consultation] : a consultation visit [FreeTextEntry1] : Long Term COVID- post vaccine paresthesias

## 2023-04-28 NOTE — DATA REVIEWED
[FreeTextEntry1] : Labs: \par 2/23 + CAROLINE 1:160 w/ neg subserologies to include APA/ LAC, RF/ CCP, cryoglobulin, SPEP/IPEP, ACE, B12/folate, tick borne panel, methylmalonic acid, paraneoplastic and  ganglioside   antibodies,\par nl CK, C3/4, TFTs, CRP, ESR, Copper, Zinc, B6\par \par Diagnostics:\par 1/23 EMG/ NCS BLE normal\par 11/20 EMG/ NCS BUE normal

## 2023-05-16 ENCOUNTER — TRANSCRIPTION ENCOUNTER (OUTPATIENT)
Age: 63
End: 2023-05-16

## 2023-05-17 ENCOUNTER — APPOINTMENT (OUTPATIENT)
Dept: ORTHOPEDIC SURGERY | Facility: CLINIC | Age: 63
End: 2023-05-17
Payer: COMMERCIAL

## 2023-05-17 VITALS — DIASTOLIC BLOOD PRESSURE: 75 MMHG | SYSTOLIC BLOOD PRESSURE: 127 MMHG | HEART RATE: 78 BPM

## 2023-05-17 PROCEDURE — 99214 OFFICE O/P EST MOD 30 MIN: CPT

## 2023-05-17 NOTE — PHYSICAL EXAM
[UE/LE] : Motor: 5/5 motor strength in bilateral upper & lower extremities [] : Sensory: [L3-LT] : L3 [L4-LT] : L4 [L5-LT] : L5 [S1-LT] : S1 [Normal RLE] : Right Lower Extremity: No scars, rashes, lesions, ulcers, skin intact [Normal LLE] : Left Lower Extremity: No scars, rashes, lesions, ulcers, skin intact [Normal] : Oriented to person, place, and time, insight and judgement were intact and the affect was normal [Obese] : not obese [de-identified] : Range of motion to the cervical spine is full in all planes.

## 2023-05-17 NOTE — DISCUSSION/SUMMARY
[6 Weeks] : in 6 weeks [de-identified] : I discussed the underlying pathophysiology of the patient's condition in great detail with the patient.  It is very possible that the patient's dysesthesias are unrelated to the neck and/or lower back.  I expressed to the patient that she can definitely try an epidural injection, but she may possible not find any significant changes to her symptoms. I provided the patient with a referral for an epidural injection and reviewed other options regarding treatment available at this time. The patient should follow-up with me in 6 weeks for further assessment of her symptoms.

## 2023-05-17 NOTE — ADDENDUM
[FreeTextEntry1] : I, Melchor Claribel Mendoza, acted solely as a scribe for Dr. Nathan Irving on this date 05/17/2023 .\par \par All medical record entries made by the Scribe were at my, Dr. Nathan Irving, direction and personally dictated by me on 05/17/2023 . I have reviewed the chart and agree that the record accurately reflects my personal performance of the history, physical exam, assessment and plan. I have also personally directed, reviewed, and agreed with the chart.

## 2023-05-17 NOTE — REASON FOR VISIT
[Follow-Up Visit] : a follow-up visit for [Degenerative Joint Disease] : degenerative joint disease [Herniated Discs] : herniated discs

## 2023-05-17 NOTE — HISTORY OF PRESENT ILLNESS
[Constant] : ~He/She~ states the symptoms seem to be constant [de-identified] : Patient presents a follow-up visit for Degenerative disc disease & herniated discs. She states that she continues to have constant lower back pain with radicular symptoms to her legs which is worse on the left compared to the right. She still has symptoms of numbness, tingling and feels weakness to her lower extremities bilaterally. She reports seeing a rheumatologist which provided her with prednisone and amitriptyline which both did not aid her symptoms. She was recommended to consult about an epidural injection.

## 2023-05-18 ENCOUNTER — NON-APPOINTMENT (OUTPATIENT)
Age: 63
End: 2023-05-18

## 2023-05-22 ENCOUNTER — OUTPATIENT (OUTPATIENT)
Dept: OUTPATIENT SERVICES | Facility: HOSPITAL | Age: 63
LOS: 1 days | End: 2023-05-22
Payer: COMMERCIAL

## 2023-05-22 ENCOUNTER — APPOINTMENT (OUTPATIENT)
Dept: NEUROPSYCHOLOGY | Facility: CLINIC | Age: 63
End: 2023-05-22

## 2023-05-22 ENCOUNTER — NON-APPOINTMENT (OUTPATIENT)
Age: 63
End: 2023-05-22

## 2023-05-22 DIAGNOSIS — G31.84 MILD COGNITIVE IMPAIRMENT OF UNCERTAIN OR UNKNOWN ETIOLOGY: ICD-10-CM

## 2023-05-22 PROCEDURE — 96121 NUBHVL XM PHY/QHP EA ADDL HR: CPT | Mod: 95

## 2023-05-22 PROCEDURE — 96116 NUBHVL XM PHYS/QHP 1ST HR: CPT | Mod: 95

## 2023-05-23 DIAGNOSIS — G31.84 MILD COGNITIVE IMPAIRMENT OF UNCERTAIN OR UNKNOWN ETIOLOGY: ICD-10-CM

## 2023-05-31 ENCOUNTER — APPOINTMENT (OUTPATIENT)
Dept: NEUROPSYCHOLOGY | Facility: CLINIC | Age: 63
End: 2023-05-31

## 2023-06-07 ENCOUNTER — APPOINTMENT (OUTPATIENT)
Dept: RHEUMATOLOGY | Facility: CLINIC | Age: 63
End: 2023-06-07
Payer: COMMERCIAL

## 2023-06-07 DIAGNOSIS — M79.7 FIBROMYALGIA: ICD-10-CM

## 2023-06-07 PROCEDURE — 99213 OFFICE O/P EST LOW 20 MIN: CPT | Mod: 95

## 2023-06-07 RX ORDER — PREDNISONE 20 MG/1
20 TABLET ORAL
Qty: 10 | Refills: 0 | Status: DISCONTINUED | COMMUNITY
Start: 2023-04-27 | End: 2023-06-07

## 2023-06-07 RX ORDER — NALTREXONE HCL 100 %
POWDER (GRAM) MISCELLANEOUS
Qty: 60 | Refills: 2 | Status: ACTIVE | COMMUNITY
Start: 2023-06-07 | End: 1900-01-01

## 2023-06-12 ENCOUNTER — APPOINTMENT (OUTPATIENT)
Dept: NEUROPSYCHOLOGY | Facility: CLINIC | Age: 63
End: 2023-06-12

## 2023-06-20 NOTE — REASON FOR VISIT
[Follow-Up: _____] : a [unfilled] follow-up visit [FreeTextEntry1] : Long Term COVID- post vaccine paresthesias
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
## 2023-06-20 NOTE — REVIEW OF SYSTEMS
[Feeling Tired] : feeling tired [Abdominal Pain] : abdominal pain [Constipation] : constipation [Diarrhea] : diarrhea [Joint Pain] : joint pain [Sleep Disturbances] : sleep disturbances [Depression] : depression [Negative] : Heme/Lymph [FreeTextEntry7] : and nausea; describes IBS symptoms- bloating abd pain  [FreeTextEntry9] : mild in shoulders [de-identified] : paresthesia in bl LE worse on LLE involving entire leg.. NO weakness all sensory  [de-identified] : controlled on Lexapro; main sleep disturbances is 's snoring and cats
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
## 2023-06-20 NOTE — PHYSICAL EXAM
[General Appearance - Alert] : alert [General Appearance - In No Acute Distress] : in no acute distress [General Appearance - Well Nourished] : well nourished [General Appearance - Well Developed] : well developed [General Appearance - Well-Appearing] : healthy appearing [Outer Ear] : the ears and nose were normal in appearance [Auscultation Breath Sounds / Voice Sounds] : lungs were clear to auscultation bilaterally [Heart Sounds Gallop] : no gallops [Murmurs] : no murmurs [Heart Sounds Pericardial Friction Rub] : no pericardial rub [Edema] : there was no peripheral edema [Abdomen Soft] : soft [No CVA Tenderness] : no ~M costovertebral angle tenderness [Abnormal Walk] : normal gait [Nail Clubbing] : no clubbing  or cyanosis of the fingernails [Musculoskeletal - Swelling] : no joint swelling seen [Motor Tone] : muscle strength and tone were normal [Sensation] : the sensory exam was normal to light touch and pinprick [No Focal Deficits] : no focal deficits [] : no rash [FreeTextEntry1] : Initial PSD 14, PHQ 6, HARPAL 2

## 2023-06-28 ENCOUNTER — APPOINTMENT (OUTPATIENT)
Dept: ORTHOPEDIC SURGERY | Facility: CLINIC | Age: 63
End: 2023-06-28
Payer: COMMERCIAL

## 2023-06-28 VITALS — HEART RATE: 65 BPM | DIASTOLIC BLOOD PRESSURE: 80 MMHG | SYSTOLIC BLOOD PRESSURE: 121 MMHG

## 2023-06-28 PROCEDURE — 99214 OFFICE O/P EST MOD 30 MIN: CPT

## 2023-06-28 NOTE — HISTORY OF PRESENT ILLNESS
[5] : a current pain level of 5/10 [Intermit.] : ~He/She~ states the symptoms seem to be intermittent [de-identified] : Patient presents a follow-up visit for Lumbar DJD & Radiculopathy. The patient reports that she continues to have pain to her lower back, with no improvement after receiving an epidural injection 4 weeks ago. She had recently seen a rheumatologist and was informed that her symptoms may be consistent with small fiber neuropathy. She also notes some Numbness & weakness in her left leg. The patient reports no other significant changes to their symptoms since their last visit.

## 2023-06-28 NOTE — DISCUSSION/SUMMARY
[Other: ____] : in [unfilled] [de-identified] : I discussed the underlying pathophysiology of the patient's condition. I expressed to the patient that she should continue to see her rheumatologist regarding her symptoms. Activity modifications were reviewed with the patient at length. If the patient has any change in her symptoms to her lower back or new radicular symptoms to her legs, she should follow-up with me sooner to receive further assessment or present to the nearest emergency room.. Otherwise, The patient should follow-up with me in 6 Months.

## 2023-06-28 NOTE — PHYSICAL EXAM
[UE/LE] : Motor: 5/5 motor strength in bilateral upper & lower extremities [] : Sensory: [L3-LT] : L3 [L4-LT] : L4 [L5-LT] : L5 [S1-LT] : S1 [Normal RLE] : Right Lower Extremity: No scars, rashes, lesions, ulcers, skin intact [Normal LLE] : Left Lower Extremity: No scars, rashes, lesions, ulcers, skin intact [Normal] : Oriented to person, place, and time, insight and judgement were intact and the affect was normal [Obese] : not obese [de-identified] : Range of motion to the cervical spine is full in all planes. [de-identified] : Recent EMG is negative for cervical or lumbar radiculopathy.

## 2023-06-28 NOTE — ADDENDUM
[FreeTextEntry1] : I, Melchor Claribel Mendoza, acted solely as a scribe for Dr. Nathan Irving on this date 06/28/2023 .\par \par All medical record entries made by the Scribe were at my, Dr. Nathan Irving, direction and personally dictated by me on 06/28/2023 . I have reviewed the chart and agree that the record accurately reflects my personal performance of the history, physical exam, assessment and plan. I have also personally directed, reviewed, and agreed with the chart.

## 2023-06-29 ENCOUNTER — TRANSCRIPTION ENCOUNTER (OUTPATIENT)
Age: 63
End: 2023-06-29

## 2023-07-06 ENCOUNTER — TRANSCRIPTION ENCOUNTER (OUTPATIENT)
Age: 63
End: 2023-07-06

## 2023-07-10 ENCOUNTER — APPOINTMENT (OUTPATIENT)
Dept: NEUROPSYCHOLOGY | Facility: CLINIC | Age: 63
End: 2023-07-10

## 2023-07-10 ENCOUNTER — OUTPATIENT (OUTPATIENT)
Dept: OUTPATIENT SERVICES | Facility: HOSPITAL | Age: 63
LOS: 1 days | End: 2023-07-10
Payer: COMMERCIAL

## 2023-07-10 DIAGNOSIS — G31.84 MILD COGNITIVE IMPAIRMENT OF UNCERTAIN OR UNKNOWN ETIOLOGY: ICD-10-CM

## 2023-07-10 PROCEDURE — 96138 PSYCL/NRPSYC TECH 1ST: CPT | Mod: 95

## 2023-07-10 PROCEDURE — 96133 NRPSYC TST EVAL PHYS/QHP EA: CPT | Mod: 95

## 2023-07-10 PROCEDURE — 96132 NRPSYC TST EVAL PHYS/QHP 1ST: CPT | Mod: 95

## 2023-07-10 PROCEDURE — 96139 PSYCL/NRPSYC TST TECH EA: CPT | Mod: 95

## 2023-07-12 ENCOUNTER — APPOINTMENT (OUTPATIENT)
Dept: NEUROLOGY | Facility: CLINIC | Age: 63
End: 2023-07-12
Payer: COMMERCIAL

## 2023-07-12 VITALS
WEIGHT: 135 LBS | BODY MASS INDEX: 24.69 KG/M2 | SYSTOLIC BLOOD PRESSURE: 106 MMHG | TEMPERATURE: 98 F | DIASTOLIC BLOOD PRESSURE: 70 MMHG | HEART RATE: 85 BPM | OXYGEN SATURATION: 96 %

## 2023-07-12 DIAGNOSIS — M50.90 CERVICAL DISC DISORDER, UNSPECIFIED, UNSPECIFIED CERVICAL REGION: ICD-10-CM

## 2023-07-12 PROCEDURE — 99214 OFFICE O/P EST MOD 30 MIN: CPT

## 2023-07-12 NOTE — HISTORY OF PRESENT ILLNESS
[FreeTextEntry1] : INTERIM HX 07/12/2023: EMG/NCS of LE 1/2023 @ [facility] was normal. Neuropathy labs 12/2022 WNL, mild elevation in K/L FLC ratio (1.85).\par Symptoms are stable. not worse or better. \par Occasional L occipital shooting pain. \par On gabapentin 300 mg qhs, higher dose did not significantly improve symptoms (however did not give it much time), and stopping would made symptoms worse. \par Seeing rheum, Supriya Mclaughlin in long COVID clinic, on amitriptyline 10 mg qhs and naltrexone. \par ------------------------------------------------------------------------------------------------------------------------------------\par HPI (initial visit Dec 27, 2022)- JOSE HERNÁNDEZ is a 62 year old woman with hx of degenerative spine disease with cervical spine stenosis (followed by Orthopedic, Dr. Irving), Migraine headaches, Asthma, right\par uterine fibroid (s/p recent hysterectomy 11/17) was admitted to [facility] 10/31/22-11/2/22 for new\par onset ascending lower extremity paresthesia’s and gait instability since 10/29/2022, after receiving vaccinations for COVID and the Flu. She was admitted to rule out possible GBS vs transverse myelitis. \par \par She reports symptoms began within 24 hours of receiving the vaccination. She reported waking up with pins and needles over entire left foot and this spread up to her left leg and then later also felt paresthesias in RLE. She denied any weakness, though walking was difficult 2/2 to the pain. No B/B issues. Symptoms worse over left leg. \par \par Admission exam: Neurological exam with no motor weakness in UE or LE. Slight increased tone at the ankles (L>R). Reflexes 2+ in UE and LE, thought relatively brisk R biceps compared to left biceps. No ty, no cross adductors, no clonus. Plantar reflex down going on right and mute on the left. Sensations reduced to pinprick\par over left leg (over shin), though intact over foot. Vibration sensation intact in UE and asymmetric in LE (R toe 10 seconds, L toe 20 seconds, R knee 16 seconds, L knee 9 seconds). Abnormal gait, drags feet, slightly spastic\par appearing. Cannot tandem walk. Romberg is absent.\par \par During admission, MRI scans of neuroaxis were ordered and are detailed below. Patient could not tolerate contrast studies.\par MRI brain showed non specific subcortical white matter lesions, likely 2/2 to\par hx of migraine headaches. No concerning pathology in brain.\par MRI cervical spine with moderate degenerative disc disease and normal spinal\par cord signal (seems overall stable compared to prior scan at [facility])\par MRI thoracic spine with mild-mod degenerative disc disease and normal spinal\par cord signal.\par MRI lumbar spine with wide spread degenerative disc disease, with neural\par foraminal narrowing worse at left L5/S1 (there is also a synovial cyst at this\par level that narrows the canal). Distal cord and conus intact.\par \par \par Given the absence of cord signal changes on MRI, transverse myelitis was considered unlikely. Additionally, given intact reflexes and normal strength exam, GBS was also considered unlikely. Her symptoms did improve slightly in the hospital with starting gabapentin. Hence, given findings noted on lumbar spine with degenerative changes, she was diagnosed with lumbar radiculopathy. She was discharged on medrol dose pack and gabapentin. Outpatient PT and Ortho follow up. \par \par She continues on gabapentin 300 mg TID, tolerating well, though continues to experience constant tingling in both her legs (L>R). She denies any numbness. Feels like rubber band around feet. No weakness. Balance good. Good control over bowel and bladder. She reports she can function fully, though the sensations are bothersome. In the left leg she feels the sensations up to her knees and up to ankles in R leg. She also reports occasional "Sciatica" in RLE. No significant lower back pain. She also reports occasional sharp pains over shoulder, more so with movements of her neck.\par \par

## 2023-07-12 NOTE — DATA REVIEWED
[de-identified] : MRI brain 11/2022 showed non specific subcortical white matter lesions, likely 2/2 to\par hx of migraine headaches. No concerning pathology in brain.\par \par MR C/T/L with/without contrast spine 11/1/2022-\par "1. CERVICAL SPINE: Widespread moderate cervical degenerative disc\par disease, most pronounced in the mid cervical spine. No high grade canal\par compromise. Reversal of the normal cervical lordosis suggestive of disc\par pathology and / or muscle spasm. Cervical cord appears intact without\par focal intrinsic lesion.\par \par \par \par Reviewed MRI cervical spine images/report from [facility] 8/20/2020-\par multilevel cervical spondylosis with posterior osteophytes indenting the\par anterior aspect of thecal sac most marked at the C3-4 and C5-6 levels with AP\par diameter spinal canal measures 7 mm. Moderate severe left C3-4, severe bilateral C4-5, severe bilateral C5-6 and mild bilateral C6-7 foraminal stenosis. No cord signal abnormality.\par \par \par 2. THORACIC SPINE: Widespread mild to moderate thoracic degenerative\par disc disease. No high-grade canal compromise. Thoracic cord appears\par intact without focal intrinsic lesion\par \par 3. LUMBAR SPINE: Widespread lumbar degenerative disc disease results in\par degenerative foraminal compromise greatestto the left at L5-S1 where\par synovial cyst contributes to compromise the ventrolateral recesses of the\par canal. Asymmetric fusiform thickening of the left S1 nerve root, suspect\par radiculopathy. Prominent lower lumbar facet arthropathy. No high-grade\par central canal stenosis. Distal cord and conus intact\par \par 4. Gadolinium-enhanced images were not tolerated this patient at the time\par of imaging. Completion gadolinium-enhanced images may be considered.\par \par 5. Structure in the right pelvis appears todeform the right dome of the\par bladder. This is incompletely evaluated, signal intensity pattern\par atypical for the uterus, possibly ovarian. Supplemental evaluation\par recommended such as by transvaginal pelvic ultrasound or pelvic MR"\par \par  [de-identified] : EMG/NCS of LE 1/2023 @ [facility] was normal. \par \par Neuropathy labs 12/2022 WNL, mild elevation in K/L FLC ratio (1.85).\par \par normal EMG/NCS 11/2020

## 2023-07-12 NOTE — ASSESSMENT
[FreeTextEntry1] : Assessment/Plan:\par  62 year old female w/ hx of degenerative cervical and lumbar disc disease with new onset paresthesias in bilateral LE (L>R) after receiving COVID/FLU vaccinations 10/28/2022. She was admitted to [facility] 10/2022 for further work up and MRI imaging of neuroaxis revealed findings consistent with degenerative changes in cervical and lumbar spine, but without any evidence of myelitis/myelopathy. EMG/NCS 1/2023 of LE normal. Neuropathy labs 12/2022 normal. \par \par Neurological exam with no motor weakness, intact reflexes (though relatively hyporeflexic in RLE) and reduced vibratory sense in RLE, otherwise intact sensations to other sensory modalities throughout.\par \par Paresthesias in b/l LE (L>R)- lumbar radiculopathy +/- small fiber neuropathy 2/2 COVID vaccination\par \par She remains clinically stable, not better not worse. \par \par Plan:-\par [] Continue gabapentin, recommend increasing dose to 300 mg TID and then increasing by 300 mg weekly till on 600 mg TID. Recommend remaining on this dose for at least 6-8 weeks before determining if in effective. If in effective, can consider lyrica.\par [] Continue amitriptyline 10 mg qhs\par \par Most radiculopathy symptoms improve/resolve with conservative treatment-example: rest, anti-inflammatory medications, physical therapy, massage therapy, heating pads, and avoiding activity that strains the neck or back. Patient was advised to avoid lifting or moving heavy objects. If radiculopathy symptoms persist despite conservative treatments, patients may benefit from an epidural steroid injection (JAYDEN), which reduces the inflammation and irritation of the nerve. Last resort would be surgical options.\par \par \par Return to clinic 3 months\par \par The above plan was discussed with JOSE HERNÁNDEZ in great detail. JOSE HERNÁNDEZ verbalized understanding and agrees with plan as detailed above. Patient was provided education and counselling on current diagnosis/symptoms, diagnostic work up, treatment options and potential side effects of any prescribed therapy/therapies. She was advised to call our clinic at [phone] for any new or worsening symptoms, or with any questions or concerns. In case of acute onset of neurological symptoms or worsening presentation, patient was advised to present to nearest emergency room for further evaluation. JOSE EDGAR expressed understanding and all her questions/concerns were addressed.\par \par Shakira Márquez M.D

## 2023-07-26 ENCOUNTER — TRANSCRIPTION ENCOUNTER (OUTPATIENT)
Age: 63
End: 2023-07-26

## 2023-08-02 ENCOUNTER — APPOINTMENT (OUTPATIENT)
Dept: NEUROPSYCHOLOGY | Facility: CLINIC | Age: 63
End: 2023-08-02

## 2023-08-09 ENCOUNTER — APPOINTMENT (OUTPATIENT)
Dept: RHEUMATOLOGY | Facility: CLINIC | Age: 63
End: 2023-08-09
Payer: COMMERCIAL

## 2023-08-09 PROCEDURE — 99213 OFFICE O/P EST LOW 20 MIN: CPT | Mod: 95

## 2023-08-14 ENCOUNTER — APPOINTMENT (OUTPATIENT)
Dept: NEUROPSYCHOLOGY | Facility: CLINIC | Age: 63
End: 2023-08-14

## 2023-08-21 ENCOUNTER — APPOINTMENT (OUTPATIENT)
Dept: NEUROPSYCHOLOGY | Facility: CLINIC | Age: 63
End: 2023-08-21

## 2023-08-21 ENCOUNTER — OUTPATIENT (OUTPATIENT)
Dept: OUTPATIENT SERVICES | Facility: HOSPITAL | Age: 63
LOS: 1 days | Discharge: HOME | End: 2023-08-21
Payer: COMMERCIAL

## 2023-08-21 DIAGNOSIS — G31.84 MILD COGNITIVE IMPAIRMENT OF UNCERTAIN OR UNKNOWN ETIOLOGY: ICD-10-CM

## 2023-08-21 PROCEDURE — 96132 NRPSYC TST EVAL PHYS/QHP 1ST: CPT | Mod: 95

## 2023-08-21 PROCEDURE — 96133 NRPSYC TST EVAL PHYS/QHP EA: CPT | Mod: 95
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
## 2023-08-21 NOTE — REASON FOR VISIT
[Follow-Up: _____] : a [unfilled] follow-up visit [FreeTextEntry1] : Long Term COVID- post vaccine paresthesias  Fluconazole Pregnancy And Lactation Text: This medication is Pregnancy Category C and it isn't know if it is safe during pregnancy. It is also excreted in breast milk.

## 2023-08-21 NOTE — REVIEW OF SYSTEMS
[Feeling Tired] : feeling tired [Abdominal Pain] : abdominal pain [Constipation] : constipation [Diarrhea] : diarrhea [Joint Pain] : joint pain [Sleep Disturbances] : sleep disturbances [Depression] : depression [Negative] : Heme/Lymph [FreeTextEntry7] : and nausea; describes IBS symptoms- bloating abd pain  [FreeTextEntry9] : mild in shoulders [de-identified] : paresthesia in bl LE worse on LLE involving entire leg.. NO weakness all sensory  [de-identified] : controlled on Lexapro; main sleep disturbances is 's snoring and cats

## 2023-08-21 NOTE — DATA REVIEWED
[FreeTextEntry1] : Labs:  2/23 + CAROLINE 1:160 w/ neg subserologies to include APA/ LAC, RF/ CCP, cryoglobulin, SPEP/IPEP, ACE, B12/folate, tick borne panel, methylmalonic acid, paraneoplastic and  ganglioside   antibodies, nl CK, C3/4, TFTs, CRP, ESR, Copper, Zinc, B6  12/22 Neuropathy labs WNL w/ only mild elevation in K/L FLC ratio (1.85)   Diagnostics: 8/23 Neurocog assessment:  clear and goal oriented thought process, cognitive functions are intact with nl processing and did well on complex processes.. her reaction time varied throughout the evaluatoin, occasionally falling into the Low Average range.. likely due to poor sleep and situational stress .    1/23 EMG/ NCS BLE normal  MR C/T/L with/without contrast spine 11/1/2022- Widespread moderate cervical degenerative disc disease, most pronounced in the mid cervical spine. No high grade canal compromise. Reversal of the normal cervical lordosis suggestive of disc pathology and / or muscle spasm. Cervical cord appears intact without focal intrinsic lesion.  2020  THORACIC SPINE: Widespread mild to moderate thoracic degenerative disc disease. No high-grade canal compromise. Thoracic cord appears intact without focal intrinsic lesion LUMBAR SPINE: Widespread lumbar degenerative disc disease results in degenerative foraminal compromise greatest to the left at L5-S1 where synovial cyst contributes to compromise the ventrolateral recesses of the canal. Asymmetric fusiform thickening of the left S1 nerve root, suspect radiculopathy. Prominent lower lumbar facet arthropathy. No high-grade central canal stenosis. Distal cord and conus intact  11/20 EMG/ NCS BUE normal

## 2023-08-21 NOTE — PHYSICAL EXAM
[General Appearance - Alert] : alert [General Appearance - In No Acute Distress] : in no acute distress [General Appearance - Well Nourished] : well nourished [General Appearance - Well Developed] : well developed [General Appearance - Well-Appearing] : healthy appearing [Outer Ear] : the ears and nose were normal in appearance [] : the neck was supple [Auscultation Breath Sounds / Voice Sounds] : lungs were clear to auscultation bilaterally [Heart Sounds Gallop] : no gallops [Murmurs] : no murmurs [Heart Sounds Pericardial Friction Rub] : no pericardial rub [Edema] : there was no peripheral edema [Abdomen Soft] : soft [No CVA Tenderness] : no ~M costovertebral angle tenderness [Abnormal Walk] : normal gait [Nail Clubbing] : no clubbing  or cyanosis of the fingernails [Musculoskeletal - Swelling] : no joint swelling seen [Motor Tone] : muscle strength and tone were normal [Sensation] : the sensory exam was normal to light touch and pinprick [No Focal Deficits] : no focal deficits [FreeTextEntry1] : Initial PSD 14, PHQ 6, HARPAL 2
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
## 2023-08-22 DIAGNOSIS — G31.84 MILD COGNITIVE IMPAIRMENT OF UNCERTAIN OR UNKNOWN ETIOLOGY: ICD-10-CM

## 2023-10-11 ENCOUNTER — APPOINTMENT (OUTPATIENT)
Dept: RHEUMATOLOGY | Facility: CLINIC | Age: 63
End: 2023-10-11
Payer: COMMERCIAL

## 2023-10-11 PROCEDURE — 99213 OFFICE O/P EST LOW 20 MIN: CPT | Mod: 95

## 2023-10-23 ENCOUNTER — APPOINTMENT (OUTPATIENT)
Dept: NEUROLOGY | Facility: CLINIC | Age: 63
End: 2023-10-23
Payer: COMMERCIAL

## 2023-10-23 PROCEDURE — 99214 OFFICE O/P EST MOD 30 MIN: CPT | Mod: 95

## 2023-11-02 ENCOUNTER — TRANSCRIPTION ENCOUNTER (OUTPATIENT)
Age: 63
End: 2023-11-02

## 2023-11-03 ENCOUNTER — TRANSCRIPTION ENCOUNTER (OUTPATIENT)
Age: 63
End: 2023-11-03

## 2023-11-27 NOTE — PHYSICAL THERAPY INITIAL EVALUATION ADULT - WEIGHT-BEARING RESTRICTIONS: GAIT, REHAB EVAL
Spoke to [employer]. Can send the pt to L&D once medically cleared from the ED. full weight-bearing

## 2023-12-01 ENCOUNTER — TRANSCRIPTION ENCOUNTER (OUTPATIENT)
Age: 63
End: 2023-12-01

## 2023-12-04 ENCOUNTER — TRANSCRIPTION ENCOUNTER (OUTPATIENT)
Age: 63
End: 2023-12-04

## 2023-12-12 ENCOUNTER — TRANSCRIPTION ENCOUNTER (OUTPATIENT)
Age: 63
End: 2023-12-12

## 2023-12-13 ENCOUNTER — APPOINTMENT (OUTPATIENT)
Dept: NEUROLOGY | Facility: CLINIC | Age: 63
End: 2023-12-13
Payer: COMMERCIAL

## 2023-12-13 PROCEDURE — 99214 OFFICE O/P EST MOD 30 MIN: CPT | Mod: 95

## 2023-12-13 RX ORDER — GABAPENTIN 300 MG/1
300 CAPSULE ORAL
Qty: 180 | Refills: 2 | Status: DISCONTINUED | COMMUNITY
Start: 2022-10-31 | End: 2023-12-13

## 2023-12-13 NOTE — DATA REVIEWED
[de-identified] : MRI brain 11/2022 showed non specific subcortical white matter lesions, likely 2/2 to\par  hx of migraine headaches. No concerning pathology in brain.\par  \par  MR C/T/L with/without contrast spine 11/1/2022-\par  "1. CERVICAL SPINE: Widespread moderate cervical degenerative disc\par  disease, most pronounced in the mid cervical spine. No high grade canal\par  compromise. Reversal of the normal cervical lordosis suggestive of disc\par  pathology and / or muscle spasm. Cervical cord appears intact without\par  focal intrinsic lesion.\par  \par  \par  \par  Reviewed MRI cervical spine images/report from [facility] 8/20/2020-\par  multilevel cervical spondylosis with posterior osteophytes indenting the\par  anterior aspect of thecal sac most marked at the C3-4 and C5-6 levels with AP\par  diameter spinal canal measures 7 mm. Moderate severe left C3-4, severe bilateral C4-5, severe bilateral C5-6 and mild bilateral C6-7 foraminal stenosis. No cord signal abnormality.\par  \par  \par  2. THORACIC SPINE: Widespread mild to moderate thoracic degenerative\par  disc disease. No high-grade canal compromise. Thoracic cord appears\par  intact without focal intrinsic lesion\par  \par  3. LUMBAR SPINE: Widespread lumbar degenerative disc disease results in\par  degenerative foraminal compromise greatestto the left at L5-S1 where\par  synovial cyst contributes to compromise the ventrolateral recesses of the\par  canal. Asymmetric fusiform thickening of the left S1 nerve root, suspect\par  radiculopathy. Prominent lower lumbar facet arthropathy. No high-grade\par  central canal stenosis. Distal cord and conus intact\par  \par  4. Gadolinium-enhanced images were not tolerated this patient at the time\par  of imaging. Completion gadolinium-enhanced images may be considered.\par  \par  5. Structure in the right pelvis appears todeform the right dome of the\par  bladder. This is incompletely evaluated, signal intensity pattern\par  atypical for the uterus, possibly ovarian. Supplemental evaluation\par  recommended such as by transvaginal pelvic ultrasound or pelvic MR"\par  \par   [de-identified] : EMG/NCS of LE 1/2023 @ [facility] was normal. \par  \par  Neuropathy labs 12/2022 WNL, mild elevation in K/L FLC ratio (1.85).\par  \par  normal EMG/NCS 11/2020

## 2023-12-13 NOTE — ASSESSMENT
[FreeTextEntry1] : Assessment/Plan: # New onset left sided headaches a/w autonomic symptoms- most suggestive of SUNCT Plan:- [] MRI brain w/w/o contrast to rule out posterior fossa pathology. [] MRA Head and Neck w/ contrast to rule out aneurysm or dissection (patricia in setting of reported anisocoria)  [] Trial of indomethacin - 25 mg TID, if not effective within 3 days, would increase to 50 mg TID. Advised continuing PPI for GI ppx. Counselled on s/e of GI bleeding.    # Paresthesia's in b/l LE (L>R) since 10/2022- lumbar radiculopathy +/- small fiber neuropathy 2/2 COVID vaccination Hx of degenerative cervical and lumbar disc disease  Receiving COVID/FLU vaccinations 10/28/2022.  Admitted to [facility] 10/2022 - MRI imaging of neuroaxis revealed findings consistent with degenerative changes in cervical and lumbar spine, but without any evidence of myelitis/myelopathy.  EMG/NCS 1/2023 of LE normal. Neuropathy labs 12/2022 normal.  Neurological exam with no motor weakness, intact reflexes (though relatively hyporeflexic in RLE) and reduced vibratory sense in RLE, otherwise intact sensations to other sensory modalities throughout.  Plan:-  [] Dc'd gabapentin  [] Continue Lyrica 50 mg QHS [] Continue amitriptyline 10 mg qhs [] Recommend lidocaine patch 4% for LBP [] Will refer to physical therapy  [] Will repeat EMG/NCS- rule out radiculopathy vs peripheral neuropathy (scheduled 12/21) [] Consider skin biopsy to rule out small fiber neuropathy if EMG/NCS negative and if symptoms persist/worsen.    Return to clinic 1 month    The above plan was discussed with JOSE HERNÁNDEZ in great detail. JOSE HERNÁNDEZ verbalized understanding and agrees with plan as detailed above. Patient was provided education and counselling on current diagnosis/symptoms, diagnostic work up, treatment options and potential side effects of any prescribed therapy/therapies. She was advised to call our clinic at [phone] for any new or worsening symptoms, or with any questions or concerns. JOSE HERNÁNDEZ expressed understanding and all her questions/concerns were addressed.    Shakira Márquez M.D.

## 2023-12-13 NOTE — HISTORY OF PRESENT ILLNESS
[FreeTextEntry1] : INTERIM HX 12/13/2023: - This is a Telehealth 2-way video visit- Here to discuss new symptoms. Headaches.  Several days of intermittent stabbing pain left eyeball/forehead pain, comes and goes. Different than usual migraines. Lasts seconds and repeats several times during the day (multiple times an hour). episode of left eye redness during episode. Seen by ophthalmologist, noted to have anisocoria, otherwise tested normal. Pain is better this week. L eye can be teary and swollen.   No hx of GI bleeding or definite ulcers.   INTERIM HX 10/23/2023: - This is a Telehealth 2-way video visit- Things are "not great". Right foot symptoms improving, tingling now only in R toes.  L leg symptoms with certain activities, like running or going upstairs or sitting for long. Constant, feels like elastic band with electricity running through it from mid-thigh to toes. +LBP.  On gabapentin 600 mg mg qhs, she was on 1800 mg QD with no benefit- ran out of script and self-tapered down. Also on amitriptyline 10 mg qhs (20 mg caused "Tics"). The meds just take a little edge off. Tried JAYDEN- made it worse.  Tried duloxetine, believes she had a side effect.   INTERIM HX 07/12/2023: EMG/NCS of LE 1/2023 @ [facility] was normal. Neuropathy labs 12/2022 WNL, mild elevation in K/L FLC ratio (1.85). Symptoms are stable. not worse or better.  Occasional L occipital shooting pain.  On gabapentin 300 mg qhs, higher dose did not significantly improve symptoms (however did not give it much time), and stopping would made symptoms worse.  Seeing rodrigo, Supriya Mclaughlin in [facility], on amitriptyline 10 mg qhs and naltrexone.  ------------------------------------------------------------------------------------------------------------------------------------ HPI (initial visit Dec 27, 2022)- JOSE HERNÁNDEZ is a 62 year old woman with hx of degenerative spine disease with cervical spine stenosis (followed by Orthopedic, Dr. Irving), Migraine headaches, Asthma, right uterine fibroid (s/p recent hysterectomy 11/17) was admitted to [facility] 10/31/22-11/2/22 for newonset ascending lower extremity paresthesia's and gait instability since 10/29/2022, after receiving vaccinations for COVID and the Flu. She was admitted to rule out possible GBS vs transverse myelitis.   She reports symptoms began within 24 hours of receiving the vaccination. She reported waking up with pins and needles over entire left foot and this spread up to her left leg and then later also felt paresthesias in RLE. She denied any weakness, though walking was difficult 2/2 to the pain. No B/B issues. Symptoms worse over left leg.   Admission exam: Neurological exam with no motor weakness in UE or LE. Slight increased tone at the ankles (L>R). Reflexes 2+ in UE and LE, thought relatively brisk R biceps compared to left biceps. No ty, no cross adductors, no clonus. Plantar reflex down going on right and mute on the left. Sensations reduced to pinprick over left leg (over shin), though intact over foot. Vibration sensation intact in UE and asymmetric in LE (R toe 10 seconds, L toe 20 seconds, R knee 16 seconds, L knee 9 seconds). Abnormal gait, drags feet, slightly spastic appearing. Cannot tandem walk. Romberg is absent.  During admission, MRI scans of neuroaxis were ordered and are detailed below. Patient could not tolerate contrast studies. MRI brain showed non specific subcortical white matter lesions, likely 2/2 to hx of migraine headaches. No concerning pathology in brain. MRI cervical spine with moderate degenerative disc disease and normal spinal cord signal (seems overall stable compared to prior scan at [facility]) MRI thoracic spine with mild-mod degenerative disc disease and normal spinal cord signal. MRI lumbar spine with wide spread degenerative disc disease, with neural foraminal narrowing worse at left L5/S1 (there is also a synovial cyst at this level that narrows the canal). Distal cord and conus intact.   Given the absence of cord signal changes on MRI, transverse myelitis was considered unlikely. Additionally, given intact reflexes and normal strength exam, GBS was also considered unlikely. Her symptoms did improve slightly in the hospital with starting gabapentin. Hence, given findings noted on lumbar spine with degenerative changes, she was diagnosed with lumbar radiculopathy. She was discharged on medrol dose pack and gabapentin. Outpatient PT and Ortho follow up.   She continues on gabapentin 300 mg TID, tolerating well, though continues to experience constant tingling in both her legs (L>R). She denies any numbness. Feels like rubber band around feet. No weakness. Balance good. Good control over bowel and bladder. She reports she can function fully, though the sensations are bothersome. In the left leg she feels the sensations up to her knees and up to ankles in R leg. She also reports occasional "Sciatica" in RLE. No significant lower back pain. She also reports occasional sharp pains over shoulder, more so with movements of her neck.

## 2023-12-15 ENCOUNTER — RESULT REVIEW (OUTPATIENT)
Age: 63
End: 2023-12-15

## 2023-12-19 ENCOUNTER — TRANSCRIPTION ENCOUNTER (OUTPATIENT)
Age: 63
End: 2023-12-19

## 2023-12-20 ENCOUNTER — TRANSCRIPTION ENCOUNTER (OUTPATIENT)
Age: 63
End: 2023-12-20

## 2023-12-20 ENCOUNTER — APPOINTMENT (OUTPATIENT)
Dept: MRI IMAGING | Facility: CLINIC | Age: 63
End: 2023-12-20
Payer: COMMERCIAL

## 2023-12-20 PROCEDURE — A9585: CPT | Mod: JW

## 2023-12-20 PROCEDURE — 70548 MR ANGIOGRAPHY NECK W/DYE: CPT

## 2023-12-20 PROCEDURE — 70553 MRI BRAIN STEM W/O & W/DYE: CPT

## 2023-12-20 PROCEDURE — 70548 MR ANGIOGRAPHY NECK W/DYE: CPT | Mod: 76

## 2023-12-20 PROCEDURE — A9585: CPT

## 2023-12-21 ENCOUNTER — APPOINTMENT (OUTPATIENT)
Dept: NEUROLOGY | Facility: CLINIC | Age: 63
End: 2023-12-21
Payer: COMMERCIAL

## 2023-12-21 ENCOUNTER — TRANSCRIPTION ENCOUNTER (OUTPATIENT)
Age: 63
End: 2023-12-21

## 2023-12-21 PROCEDURE — 95911 NRV CNDJ TEST 9-10 STUDIES: CPT

## 2023-12-21 PROCEDURE — 95886 MUSC TEST DONE W/N TEST COMP: CPT

## 2023-12-26 ENCOUNTER — TRANSCRIPTION ENCOUNTER (OUTPATIENT)
Age: 63
End: 2023-12-26

## 2024-01-03 ENCOUNTER — APPOINTMENT (OUTPATIENT)
Dept: ORTHOPEDIC SURGERY | Facility: CLINIC | Age: 64
End: 2024-01-03
Payer: COMMERCIAL

## 2024-01-03 VITALS — SYSTOLIC BLOOD PRESSURE: 117 MMHG | HEART RATE: 85 BPM | DIASTOLIC BLOOD PRESSURE: 77 MMHG

## 2024-01-03 PROCEDURE — 99214 OFFICE O/P EST MOD 30 MIN: CPT

## 2024-01-03 RX ORDER — METHYLPREDNISOLONE 4 MG/1
4 TABLET ORAL
Qty: 1 | Refills: 0 | Status: ACTIVE | COMMUNITY
Start: 2024-01-03 | End: 1900-01-01

## 2024-01-03 NOTE — DISCUSSION/SUMMARY
[2 Weeks] : in 2 weeks [Medication Risks Reviewed] : Medication risks reviewed [de-identified] : I discussed the underlying pathophysiology of the patient's condition. Due to the patient's continued symptoms to both her upper and lower extremities despite conservative treatment, I expressed to the patient that at this time, it would be in her best interest to obtain additional studies in the form of an MRI to evaluate any progression in the cervical stenosis which could be producing left upper and left lower extremity dysesthesias and pain along with neck pain.  Patient also has a history of lumbar degenerative disease, and it would benefit is to rule out the back as a supplementary cause of the patient's left lower extremity symptoms. I provided the patient a prescription to obtain an MRI & a Medrol Dose Pack to aid her symptoms. The patient should follow-up with me in 1-2 weeks to review the results of the MRI & further assessment of her symptoms.

## 2024-01-03 NOTE — ADDENDUM
[FreeTextEntry1] : I, Melchor Sanford Jr, acted solely as a scribe for Dr. Nathan Irving on this date 01/03/2024 .  All medical record entries made by the Scribe were at my, Dr. Nathan Irving, direction and personally dictated by me on 01/03/2024 . I have reviewed the chart and agree that the record accurately reflects my personal performance of the history, physical exam, assessment and plan. I have also personally directed, reviewed, and agreed with the chart.

## 2024-01-03 NOTE — HISTORY OF PRESENT ILLNESS
[2] : a minimum pain level of 2/10 [6] : a maximum pain level of 6/10 [Intermit.] : ~He/She~ states the symptoms seem to be intermittent [de-identified] : Patient presents a follow-up visit for Cervical Stenosis & Lumbar DDD. The patient reports that she continues to have pain pertaining to her upper extremities at this time. She has been experiencing increased migraines for the past few weeks and pain radiating down her neck with certain movements to her neck. The patient also continues to have symptoms pertaining to her left lower extremities. The patient notes that she her symptoms are progressing in terms of pain. The patient currently takes no pain medication at this time.  Patient underwent a thorough neurologic consultation including MRAs and MRV's as well as EMGs all which do not show any significant pathology.

## 2024-01-03 NOTE — PHYSICAL EXAM
[UE/LE] : Motor: 5/5 motor strength in bilateral upper & lower extremities [] : Sensory: [L3-LT] : L3 [L4-LT] : L4 [L5-LT] : L5 [S1-LT] : S1 [Normal RLE] : Right Lower Extremity: No scars, rashes, lesions, ulcers, skin intact [Normal LLE] : Left Lower Extremity: No scars, rashes, lesions, ulcers, skin intact [Normal] : Oriented to person, place, and time, insight and judgement were intact and the affect was normal [Obese] : not obese [de-identified] : Range of motion to the cervical spine is full in all planes but painful.

## 2024-01-10 ENCOUNTER — APPOINTMENT (OUTPATIENT)
Dept: NEUROLOGY | Facility: CLINIC | Age: 64
End: 2024-01-10
Payer: COMMERCIAL

## 2024-01-10 VITALS
HEART RATE: 81 BPM | WEIGHT: 137.56 LBS | OXYGEN SATURATION: 98 % | BODY MASS INDEX: 25.32 KG/M2 | TEMPERATURE: 97.6 F | SYSTOLIC BLOOD PRESSURE: 122 MMHG | RESPIRATION RATE: 16 BRPM | DIASTOLIC BLOOD PRESSURE: 76 MMHG | HEIGHT: 62 IN

## 2024-01-10 DIAGNOSIS — R20.2 PARESTHESIA OF SKIN: ICD-10-CM

## 2024-01-10 PROCEDURE — 99215 OFFICE O/P EST HI 40 MIN: CPT

## 2024-01-10 RX ORDER — AMITRIPTYLINE HYDROCHLORIDE 10 MG/1
10 TABLET, FILM COATED ORAL
Qty: 60 | Refills: 2 | Status: DISCONTINUED | COMMUNITY
Start: 2023-04-27 | End: 2024-01-10

## 2024-01-10 NOTE — HISTORY OF PRESENT ILLNESS
[FreeTextEntry1] : INTERIM HX 01/10/2024: MRI brain w/w/o contrast 12/20/2023 with mild microvascular ischemic changes and MRA/V H normal.  EMG/NCS of LE 12/21/2023 normal Headaches better. Taking indomethacin 25 mg BID, could not tolerate higher doses because of bruising.  She stopped amitriptyline 1 week ago, ran out, does not wish to resume at this time.  Saw ortho spine last week, repeat spine imaging recommended.  In PT x 5 weeks.  + Neck pain. + L occipital neuralgia.  No radicular pains or paresthesia's in arms.  LLE: paresthesias and numbness from mid thigh to feet RLE: tingling in toes and sole.    INTERIM HX 12/13/2023: - This is a Telehealth 2-way video visit- Here to discuss new symptoms. Headaches.  Several days of intermittent stabbing pain left eyeball/forehead pain, comes and goes. Different than usual migraines. Lasts seconds and repeats several times during the day (multiple times an hour). episode of left eye redness during episode. Seen by ophthalmologist, noted to have anisocoria, otherwise tested normal. Pain is better this week. L eye can be teary and swollen.   No hx of GI bleeding or definite ulcers.   INTERIM HX 10/23/2023: - This is a Telehealth 2-way video visit- Things are "not great". Right foot symptoms improving, tingling now only in R toes.  L leg symptoms with certain activities, like running or going upstairs or sitting for long. Constant, feels like elastic band with electricity running through it from mid-thigh to toes. +LBP.  On gabapentin 600 mg mg qhs, she was on 1800 mg QD with no benefit- ran out of script and self-tapered down. Also on amitriptyline 10 mg qhs (20 mg caused "Tics"). The meds just take a little edge off. Tried JAYDEN- made it worse.  Tried duloxetine, believes she had a side effect.   INTERIM HX 07/12/2023: EMG/NCS of LE 1/2023 @ [facility] was normal. Neuropathy labs 12/2022 WNL, mild elevation in K/L FLC ratio (1.85). Symptoms are stable. not worse or better.  Occasional L occipital shooting pain.  On gabapentin 300 mg qhs, higher dose did not significantly improve symptoms (however did not give it much time), and stopping would made symptoms worse.  Seeing rodrigo, Supriya Mclaughlin in long COVID clinic, on amitriptyline 10 mg qhs and naltrexone.  ------------------------------------------------------------------------------------------------------------------------------------ [facility] (initial visit Dec 27, 2022)- JOSE HERNÁNDEZ is a 62 year old woman with hx of degenerative spine disease with cervical spine stenosis (followed by Orthopedic, Dr. Irving), Migraine headaches, Asthma, right uterine fibroid (s/p recent hysterectomy 11/17) was admitted to [facility] 10/31/22-11/2/22 for newonset ascending lower extremity paresthesia's and gait instability since 10/29/2022, after receiving vaccinations for COVID and the Flu. She was admitted to rule out possible GBS vs transverse myelitis.   She reports symptoms began within 24 hours of receiving the vaccination. She reported waking up with pins and needles over entire left foot and this spread up to her left leg and then later also felt paresthesias in RLE. She denied any weakness, though walking was difficult 2/2 to the pain. No B/B issues. Symptoms worse over left leg.   Admission exam: Neurological exam with no motor weakness in UE or LE. Slight increased tone at the ankles (L>R). Reflexes 2+ in UE and LE, thought relatively brisk R biceps compared to left biceps. No ty, no cross adductors, no clonus. Plantar reflex down going on right and mute on the left. Sensations reduced to pinprick over left leg (over shin), though intact over foot. Vibration sensation intact in UE and asymmetric in LE (R toe 10 seconds, L toe 20 seconds, R knee 16 seconds, L knee 9 seconds). Abnormal gait, drags feet, slightly spastic appearing. Cannot tandem walk. Romberg is absent.  During admission, MRI scans of neuroaxis were ordered and are detailed below. Patient could not tolerate contrast studies. MRI brain showed non specific subcortical white matter lesions, likely 2/2 to hx of migraine headaches. No concerning pathology in brain. MRI cervical spine with moderate degenerative disc disease and normal spinal cord signal (seems overall stable compared to prior scan at [facility]) MRI thoracic spine with mild-mod degenerative disc disease and normal spinal cord signal. MRI lumbar spine with wide spread degenerative disc disease, with neural foraminal narrowing worse at left L5/S1 (there is also a synovial cyst at this level that narrows the canal). Distal cord and conus intact.   Given the absence of cord signal changes on MRI, transverse myelitis was considered unlikely. Additionally, given intact reflexes and normal strength exam, GBS was also considered unlikely. Her symptoms did improve slightly in the hospital with starting gabapentin. Hence, given findings noted on lumbar spine with degenerative changes, she was diagnosed with lumbar radiculopathy. She was discharged on medrol dose pack and gabapentin. Outpatient PT and Ortho follow up.   She continues on gabapentin 300 mg TID, tolerating well, though continues to experience constant tingling in both her legs (L>R). She denies any numbness. Feels like rubber band around feet. No weakness. Balance good. Good control over bowel and bladder. She reports she can function fully, though the sensations are bothersome. In the left leg she feels the sensations up to her knees and up to ankles in R leg. She also reports occasional "Sciatica" in RLE. No significant lower back pain. She also reports occasional sharp pains over shoulder, more so with movements of her neck.

## 2024-01-10 NOTE — ASSESSMENT
[FreeTextEntry1] : Assessment/Plan: # New onset left sided headaches a/w autonomic symptoms since 12/2023- most suggestive of SUNCT MRI brain w/o acute pathology, MRA/V H normal.  Headaches better on indomethacin, however given side effects (bruising) pt wishes to try tapering off the medication. She will decrease dose by 25 mg QD (currently on BID dose) for 1 week and then taper off, as long as headaches do not worsen. Advised to resume meds if headaches return.   # Paresthesia's in b/l LE (L>R) since 10/2022- lumbar radiculopathy +/- small fiber neuropathy 2/2 COVID vaccination Hx of degenerative cervical and lumbar disc disease Received COVID/FLU vaccinations 10/28/2022. Admitted to [facility] 10/2022 - MRI imaging of neuroaxis revealed findings consistent with degenerative changes in cervical and lumbar spine, but without any evidence of myelitis/myelopathy. EMG/NCS 1/2023 of LE normal. Neuropathy labs 12/2022 normal. EMG/NCS of LE 12/21/2023 normal  Neurological exam with no motor weakness, intact reflexes (though relatively hyperreflexic in LLE) and reduced vibratory sense in RLE, otherwise intact sensations to other sensory modalities throughout.  Imp- Given asymmetry in paresthesia's in LE, suspect symptoms 2/2 lumbar radiculopathy rather than small fiber neuropathy.   Plan:- [] Continue Lyrica 50 mg QHS.  Dc'd gabapentin (in effective). Pt self dc'd amitriptyline.  [] Recommend lidocaine patch 4% for LBP [] Continue to physical therapy [] Continue to follow up with ortho spine. Will review MRI's once completed [] Discussed the option for skin biopsy to rule out small fiber neuropathy- pt would like to defer for now.    Return to clinic 4 months   The above plan was discussed with JOSE HERNÁNDEZ in great detail. JOSE HERNÁNDEZ verbalized understanding and agrees with plan as detailed above. Patient was provided education and counselling on current diagnosis/symptoms, diagnostic work up, treatment options and potential side effects of any prescribed therapy/therapies. She was advised to call our clinic at [phone] for any new or worsening symptoms, or with any questions or concerns. JOSE HERNÁNDEZ expressed understanding and all her questions/concerns were addressed.    Shakira Márquez M.D.

## 2024-01-10 NOTE — DATA REVIEWED
[de-identified] : MRI brain w/w/o contrast 12/20/2023 with mild microvascular ischemic changes and MRA/V H normal.  MRI brain 11/2022 showed non specific subcortical white matter lesions, likely 2/2 to hx of migraine headaches. No concerning pathology in brain.  MR C/T/L with/without contrast spine 11/1/2022- "1. CERVICAL SPINE: Widespread moderate cervical degenerative disc disease, most pronounced in the mid cervical spine. No high grade canal compromise. Reversal of the normal cervical lordosis suggestive of disc pathology and / or muscle spasm. Cervical cord appears intact without focal intrinsic lesion.    Reviewed MRI cervical spine images/report from [facility] 8/20/2020- multilevel cervical spondylosis with posterior osteophytes indenting the anterior aspect of thecal sac most marked at the C3-4 and C5-6 levels with AP diameter spinal canal measures 7 mm. Moderate severe left C3-4, severe bilateral C4-5, severe bilateral C5-6 and mild bilateral C6-7 foraminal stenosis. No cord signal abnormality.   2. THORACIC SPINE: Widespread mild to moderate thoracic degenerative disc disease. No high-grade canal compromise. Thoracic cord appears intact without focal intrinsic lesion  3. LUMBAR SPINE: Widespread lumbar degenerative disc disease results in degenerative foraminal compromise greatestto the left at L5-S1 where synovial cyst contributes to compromise the ventrolateral recesses of the canal. Asymmetric fusiform thickening of the left S1 nerve root, suspect radiculopathy. Prominent lower lumbar facet arthropathy. No high-grade central canal stenosis. Distal cord and conus intact  4. Gadolinium-enhanced images were not tolerated this patient at the time of imaging. Completion gadolinium-enhanced images may be considered.  5. Structure in the right pelvis appears todeform the right dome of the bladder. This is incompletely evaluated, signal intensity pattern atypical for the uterus, possibly ovarian. Supplemental evaluation recommended such as by transvaginal pelvic ultrasound or pelvic MR"   [de-identified] : EMG/NCS of LE 1/2023 @ [facility] was normal. \par  \par  Neuropathy labs 12/2022 WNL, mild elevation in K/L FLC ratio (1.85).\par  \par  normal EMG/NCS 11/2020

## 2024-01-10 NOTE — PHYSICAL EXAM
[FreeTextEntry1] : The patient is alert, attentive, and conversational memory intact No dysarthria Face is symmetric with normal eye closure and smile. Motor: Strength is full bilaterally. 5/5 muscle power in bilateral UE and LE. Reflexes: Biceps +2 b/l, R Patellar and ankle reflex 2+ and Left Patellar and ankle reflex 3+. Plantar responses are flexor. No clonus Sensory: Intact sensations to light touch/pinprick/temperature/joint proprioception in upper and lower extremities. Vibratory sensations UE > LE, and LLE > RLE Vibration sense: 6 seconds right toe and 12 seconds at right knee  21 seconds left toe and knee  27 seconds hands Gait/Stance:  Gait is steady with normal steps, base, arm swing, and turning.

## 2024-01-13 ENCOUNTER — APPOINTMENT (OUTPATIENT)
Dept: MRI IMAGING | Facility: CLINIC | Age: 64
End: 2024-01-13
Payer: COMMERCIAL

## 2024-01-13 PROCEDURE — 72148 MRI LUMBAR SPINE W/O DYE: CPT

## 2024-01-13 PROCEDURE — 72141 MRI NECK SPINE W/O DYE: CPT

## 2024-01-18 ENCOUNTER — TRANSCRIPTION ENCOUNTER (OUTPATIENT)
Age: 64
End: 2024-01-18

## 2024-01-19 ENCOUNTER — TRANSCRIPTION ENCOUNTER (OUTPATIENT)
Age: 64
End: 2024-01-19

## 2024-01-22 ENCOUNTER — APPOINTMENT (OUTPATIENT)
Dept: ORTHOPEDIC SURGERY | Facility: CLINIC | Age: 64
End: 2024-01-22
Payer: COMMERCIAL

## 2024-01-22 ENCOUNTER — NON-APPOINTMENT (OUTPATIENT)
Age: 64
End: 2024-01-22

## 2024-01-22 VITALS — DIASTOLIC BLOOD PRESSURE: 84 MMHG | SYSTOLIC BLOOD PRESSURE: 133 MMHG | HEART RATE: 71 BPM

## 2024-01-22 DIAGNOSIS — M41.9 SCOLIOSIS, UNSPECIFIED: ICD-10-CM

## 2024-01-22 DIAGNOSIS — M51.36 OTHER INTERVERTEBRAL DISC DEGENERATION, LUMBAR REGION: ICD-10-CM

## 2024-01-22 PROCEDURE — 99214 OFFICE O/P EST MOD 30 MIN: CPT

## 2024-01-22 NOTE — DISCUSSION/SUMMARY
[Other: ____] : in [unfilled] [Surgical risks reviewed] : Surgical risks reviewed [de-identified] : I discussed the underlying pathophysiology of the patient's condition & MRI findings. I expressed to the patient that her symptoms are consistent with degenerative arthritis in both her neck and lower back, which had flared up recently. The patient and I discussed her options regarding treatment. At this time, I recommended that the patient continues to monitor her symptoms, as she has seen some improvement to her symptoms. Activity modifications were reviewed with the patient at length. The patient should continue to remain active and exercise regularly. If the patient begins to experience any changes or severe exacerbation of her symptoms, she should reach out to me as soon as possible. The patient should follow-up with me in 1 Year for further assessment of her symptoms.  The patient will also continue following up with her neurologist.

## 2024-01-22 NOTE — PHYSICAL EXAM
[UE/LE] : Motor: 5/5 motor strength in bilateral upper & lower extremities [] : Sensory: [L3-LT] : L3 [L4-LT] : L4 [S1-LT] : S1 [L5-LT] : L5 [Normal] : Oriented to person, place, and time, insight and judgement were intact and the affect was normal [Obese] : not obese [de-identified] : Range of motion to the cervical spine is full in all planes but painful. [de-identified] : MRI Evaluation of the Lumbar Spine performed on 01/13/24 shows Moderate multilevel lower thoracic and lumbar spondylosis in the setting of multilevel spondylolisthesis and broad levocurvature. *  Multilevel mild spinal canal stenosis. Multilevel foraminal narrowing, severe on the left at L5-S1 with contact on the left L5 exiting nerve. Marked decrease in the size of the left facet cyst.  MRI Evaluation of the Cervical Spine performed on 01/13/24 shows Moderate multilevel cervical spondylosis with multilevel spondylolisthesis with multilevel spinal canal and foraminal narrowing, similar to prior. *  Small foci of cord signal hyperintensity at C4 and C5 which may be myelomalacia.

## 2024-01-22 NOTE — ADDENDUM
[FreeTextEntry1] : I, Melchor Sanford Jr, acted solely as a scribe for Dr. Nathan Irving on this date 01/22/2024 .  All medical record entries made by the Scribe were at my, Dr. Nathan Irving, direction and personally dictated by me on 01/22/2024 . I have reviewed the chart and agree that the record accurately reflects my personal performance of the history, physical exam, assessment and plan. I have also personally directed, reviewed, and agreed with the chart.

## 2024-01-22 NOTE — HISTORY OF PRESENT ILLNESS
[3] : a current pain level of 3/10 [de-identified] : Patient presents a follow-up visit to review the results of a recent MRI of the Cervical & Lumbar Spine. The patient continues to have pain pertaining to her neck and lower back at this time with radicular symptoms. The patient reports no significant changes to their symptoms since their last visit. The patient notes that she continues to have numbness & tingling along with weakness to her lower extremities bilaterally. She denies any radicular symptoms. The patient currently takes Lyrica to aid her symptoms.

## 2024-01-24 ENCOUNTER — TRANSCRIPTION ENCOUNTER (OUTPATIENT)
Age: 64
End: 2024-01-24

## 2024-01-26 ENCOUNTER — TRANSCRIPTION ENCOUNTER (OUTPATIENT)
Age: 64
End: 2024-01-26

## 2024-04-02 ENCOUNTER — TRANSCRIPTION ENCOUNTER (OUTPATIENT)
Age: 64
End: 2024-04-02

## 2024-04-02 RX ORDER — PREGABALIN 25 MG/1
25 CAPSULE ORAL
Qty: 60 | Refills: 3 | Status: ACTIVE | COMMUNITY
Start: 2023-10-23 | End: 1900-01-01

## 2024-04-03 ENCOUNTER — TRANSCRIPTION ENCOUNTER (OUTPATIENT)
Age: 64
End: 2024-04-03

## 2024-04-04 ENCOUNTER — TRANSCRIPTION ENCOUNTER (OUTPATIENT)
Age: 64
End: 2024-04-04

## 2024-05-15 ENCOUNTER — APPOINTMENT (OUTPATIENT)
Dept: NEUROLOGY | Facility: CLINIC | Age: 64
End: 2024-05-15
Payer: COMMERCIAL

## 2024-05-15 VITALS
WEIGHT: 134 LBS | SYSTOLIC BLOOD PRESSURE: 100 MMHG | HEART RATE: 87 BPM | HEIGHT: 62 IN | BODY MASS INDEX: 24.66 KG/M2 | OXYGEN SATURATION: 99 % | DIASTOLIC BLOOD PRESSURE: 70 MMHG

## 2024-05-15 DIAGNOSIS — M54.12 RADICULOPATHY, CERVICAL REGION: ICD-10-CM

## 2024-05-15 DIAGNOSIS — G44.059 SHORT LASTING UNILATERAL NEURALGIFORM HEADACHE WITH CONJUNCTIVAL INJECTION AND TEARING (SUNCT), NOT INTRACTABLE: ICD-10-CM

## 2024-05-15 DIAGNOSIS — M48.02 SPINAL STENOSIS, CERVICAL REGION: ICD-10-CM

## 2024-05-15 DIAGNOSIS — M51.34 OTHER INTERVERTEBRAL DISC DEGENERATION, THORACIC REGION: ICD-10-CM

## 2024-05-15 DIAGNOSIS — M54.16 RADICULOPATHY, LUMBAR REGION: ICD-10-CM

## 2024-05-15 PROCEDURE — 99214 OFFICE O/P EST MOD 30 MIN: CPT

## 2024-05-15 RX ORDER — INDOMETHACIN 25 MG/1
25 CAPSULE ORAL 3 TIMES DAILY
Qty: 90 | Refills: 2 | Status: DISCONTINUED | COMMUNITY
Start: 2023-12-13 | End: 2024-05-15

## 2024-05-15 NOTE — PHYSICAL EXAM
[FreeTextEntry1] : The patient is alert, attentive, and conversational memory intact No dysarthria Face is symmetric with normal eye closure and smile. Motor: Strength is full bilaterally. 5/5 muscle power in bilateral UE and LE. Reflexes: Biceps +2 b/l, R Patellar and ankle reflex 2+ and Left Patellar and ankle reflex 3+. Plantar responses are flexor. No clonus Sensory: Diminished pinprick over RLE.  Gait/Stance: Gait is steady with normal steps, base, arm swing, and turning.

## 2024-05-15 NOTE — DATA REVIEWED
[de-identified] : MRI Evaluation of the Lumbar Spine performed on 01/13/24 shows Moderate multilevel lower thoracic and lumbar spondylosis in the setting of multilevel spondylolisthesis and broad levocurvature. * Multilevel mild spinal canal stenosis. Multilevel foraminal narrowing, severe on the left at L5-S1 with contact on the left L5 exiting nerve. Marked decrease in the size of the left facet cyst.  MRI Evaluation of the Cervical Spine performed on 01/13/24 shows Moderate multilevel cervical spondylosis with multilevel spondylolisthesis with multilevel spinal canal and foraminal narrowing, similar to prior. * Small foci of cord signal hyperintensity at C4 and C5 which may be myelomalacia.    MRI brain w/w/o contrast 12/20/2023 with mild microvascular ischemic changes and MRA/V H normal.  MRI brain 11/2022 showed non specific subcortical white matter lesions, likely 2/2 to hx of migraine headaches. No concerning pathology in brain.  MR C/T/L with/without contrast spine 11/1/2022- "1. CERVICAL SPINE: Widespread moderate cervical degenerative disc disease, most pronounced in the mid cervical spine. No high grade canal compromise. Reversal of the normal cervical lordosis suggestive of disc pathology and / or muscle spasm. Cervical cord appears intact without focal intrinsic lesion.    Reviewed MRI cervical spine images/report from [facility] 8/20/2020- multilevel cervical spondylosis with posterior osteophytes indenting the anterior aspect of thecal sac most marked at the C3-4 and C5-6 levels with AP diameter spinal canal measures 7 mm. Moderate severe left C3-4, severe bilateral C4-5, severe bilateral C5-6 and mild bilateral C6-7 foraminal stenosis. No cord signal abnormality.   2. THORACIC SPINE: Widespread mild to moderate thoracic degenerative disc disease. No high-grade canal compromise. Thoracic cord appears intact without focal intrinsic lesion  3. LUMBAR SPINE: Widespread lumbar degenerative disc disease results in degenerative foraminal compromise greatestto the left at L5-S1 where synovial cyst contributes to compromise the ventrolateral recesses of the canal. Asymmetric fusiform thickening of the left S1 nerve root, suspect radiculopathy. Prominent lower lumbar facet arthropathy. No high-grade central canal stenosis. Distal cord and conus intact  4. Gadolinium-enhanced images were not tolerated this patient at the time of imaging. Completion gadolinium-enhanced images may be considered.  5. Structure in the right pelvis appears todeform the right dome of the bladder. This is incompletely evaluated, signal intensity pattern atypical for the uterus, possibly ovarian. Supplemental evaluation recommended such as by transvaginal pelvic ultrasound or pelvic MR"   [de-identified] : EMG/NCS of LE 1/2023 @ [facility] was normal. \par  \par  Neuropathy labs 12/2022 WNL, mild elevation in K/L FLC ratio (1.85).\par  \par  normal EMG/NCS 11/2020

## 2024-05-15 NOTE — HISTORY OF PRESENT ILLNESS
[FreeTextEntry1] : INTERIM HX 05/15/2024: MR C and L spine completed 1/2024 (ordered by Ortho, scan reviewed)- C spine MRI with moderate multilevel spondylosis and small foci of myelomalacia at C4 and C5. L spine MRI with moderate multilevel spondylosis as well with foraminal narrowing (severe on the left at L5.S1 that contacts exiting L5 nerve root).  She is seeing functional medicine. Getting lumbar nerve block, got one already- it helped for a few days.  "low grade headaches"- frontal, "low ache".  no congestion. Takes tylenol or advil. no photophpbia or phonophobia.  SUNCT headaches resolved, dc'd indomethacin. + dry eyes. Got plugs.   INTERIM HX 01/10/2024: MRI brain w/w/o contrast 12/20/2023 with mild microvascular ischemic changes and MRA/V H normal.  EMG/NCS of LE 12/21/2023 normal Headaches better. Taking indomethacin 25 mg BID, could not tolerate higher doses because of bruising.  She stopped amitriptyline 1 week ago, ran out, does not wish to resume at this time.  Saw ortho spine last week, repeat spine imaging recommended.  In PT x 5 weeks.  + Neck pain. + L occipital neuralgia.  No radicular pains or paresthesia's in arms.  LLE: paresthesias and numbness from mid thigh to feet RLE: tingling in toes and sole.    INTERIM HX 12/13/2023: - This is a Telehealth 2-way video visit- Here to discuss new symptoms. Headaches.  Several days of intermittent stabbing pain left eyeball/forehead pain, comes and goes. Different than usual migraines. Lasts seconds and repeats several times during the day (multiple times an hour). episode of left eye redness during episode. Seen by ophthalmologist, noted to have anisocoria, otherwise tested normal. Pain is better this week. L eye can be teary and swollen.   No hx of GI bleeding or definite ulcers.   INTERIM HX 10/23/2023: - This is a Telehealth 2-way video visit- Things are "not great". Right foot symptoms improving, tingling now only in R toes.  L leg symptoms with certain activities, like running or going upstairs or sitting for long. Constant, feels like elastic band with electricity running through it from mid-thigh to toes. +LBP.  On gabapentin 600 mg mg qhs, she was on 1800 mg QD with no benefit- ran out of script and self-tapered down. Also on amitriptyline 10 mg qhs (20 mg caused "Tics"). The meds just take a little edge off. Tried JAYDEN- made it worse.  Tried duloxetine, believes she had a side effect.   INTERIM HX 07/12/2023: EMG/NCS of LE 1/2023 @ [facility] was normal. Neuropathy labs 12/2022 WNL, mild elevation in K/L FLC ratio (1.85). Symptoms are stable. not worse or better.  Occasional L occipital shooting pain.  On gabapentin 300 mg qhs, higher dose did not significantly improve symptoms (however did not give it much time), and stopping would made symptoms worse.  Seeing rodrigo, Supriya Mclaughlin in long COVID clinic, on amitriptyline 10 mg qhs and naltrexone.  ------------------------------------------------------------------------------------------------------------------------------------ HPI (initial visit Dec 27, 2022)- JOSE HERNÁNDEZ is a 62 year old woman with hx of degenerative spine disease with cervical spine stenosis (followed by Orthopedic, Dr. Irving), Migraine headaches, Asthma, right uterine fibroid (s/p recent hysterectomy 11/17) was admitted to [facility] 10/31/22-11/2/22 for newonset ascending lower extremity paresthesia's and gait instability since 10/29/2022, after receiving vaccinations for COVID and the Flu. She was admitted to rule out possible GBS vs transverse myelitis.   She reports symptoms began within 24 hours of receiving the vaccination. She reported waking up with pins and needles over entire left foot and this spread up to her left leg and then later also felt paresthesias in RLE. She denied any weakness, though walking was difficult 2/2 to the pain. No B/B issues. Symptoms worse over left leg.   Admission exam: Neurological exam with no motor weakness in UE or LE. Slight increased tone at the ankles (L>R). Reflexes 2+ in UE and LE, thought relatively brisk R biceps compared to left biceps. No ty, no cross adductors, no clonus. Plantar reflex down going on right and mute on the left. Sensations reduced to pinprick over left leg (over shin), though intact over foot. Vibration sensation intact in UE and asymmetric in LE (R toe 10 seconds, L toe 20 seconds, R knee 16 seconds, L knee 9 seconds). Abnormal gait, drags feet, slightly spastic appearing. Cannot tandem walk. Romberg is absent.  During admission, MRI scans of neuroaxis were ordered and are detailed below. Patient could not tolerate contrast studies. MRI brain showed non specific subcortical white matter lesions, likely 2/2 to hx of migraine headaches. No concerning pathology in brain. MRI cervical spine with moderate degenerative disc disease and normal spinal cord signal (seems overall stable compared to prior scan at [facility]) MRI thoracic spine with mild-mod degenerative disc disease and normal spinal cord signal. MRI lumbar spine with wide spread degenerative disc disease, with neural foraminal narrowing worse at left L5/S1 (there is also a synovial cyst at this level that narrows the canal). Distal cord and conus intact.   Given the absence of cord signal changes on MRI, transverse myelitis was considered unlikely. Additionally, given intact reflexes and normal strength exam, GBS was also considered unlikely. Her symptoms did improve slightly in the hospital with starting gabapentin. Hence, given findings noted on lumbar spine with degenerative changes, she was diagnosed with lumbar radiculopathy. She was discharged on medrol dose pack and gabapentin. Outpatient PT and Ortho follow up.   She continues on gabapentin 300 mg TID, tolerating well, though continues to experience constant tingling in both her legs (L>R). She denies any numbness. Feels like rubber band around feet. No weakness. Balance good. Good control over bowel and bladder. She reports she can function fully, though the sensations are bothersome. In the left leg she feels the sensations up to her knees and up to ankles in R leg. She also reports occasional "Sciatica" in RLE. No significant lower back pain. She also reports occasional sharp pains over shoulder, more so with movements of her neck.

## 2024-06-08 NOTE — PROGRESS NOTE ADULT - ATTENDING COMMENTS
Patient doing better this AM. Able to walk across the caban with more ease. Continues to experience paresthesias, more over left leg. I reviewed MRI imaging with patient at bedside. Strength still 5/5 UE and LE. Reflexes are symmetric (+2) and intact.   MRI brain showed non specific subcortical white matter lesions, likely 2/2 to hx of migraine headaches. No concerning pathology in brain.   MRI cervical spine with moderate degenerative disc disease and normal spinal cord signal (seems overall stable compared to prior scan at [facility])  MRI thoracic spine with mild-mod degenerative disc disease and normal spinal cord signal.   MRI lumbar spine with wide spread degenerative disc disease, with neural foraminal narrowing worse at left L5/S1 (there is also a synovial cyst at this level that narrows the canal). Distal cord and conus intact.     I suspect her symptoms are most likely 2/2 lumbosacral radiculopathy from underlying degenerative spine disease.     I recommended continue PT.   Patient is stable to discharge home today.  Will prescribe medrol dose pack   Will prescribe gabapentin (300 mg qhs x 1 week, then 300 mg BID x 1 week, and then continue on 300 mg TID, as tolerated)  Pt to follow up with Ortho spine as outpatient  Follow up with neurology as outpatient. Plan for EMG/NCS as outpatient.
hide

## 2024-09-04 ENCOUNTER — APPOINTMENT (OUTPATIENT)
Dept: NEUROLOGY | Facility: CLINIC | Age: 64
End: 2024-09-04
Payer: COMMERCIAL

## 2024-09-04 VITALS
OXYGEN SATURATION: 99 % | DIASTOLIC BLOOD PRESSURE: 74 MMHG | WEIGHT: 126.5 LBS | HEART RATE: 79 BPM | BODY MASS INDEX: 23.28 KG/M2 | HEIGHT: 62 IN | SYSTOLIC BLOOD PRESSURE: 116 MMHG | RESPIRATION RATE: 16 BRPM | TEMPERATURE: 97.9 F

## 2024-09-04 DIAGNOSIS — M54.12 RADICULOPATHY, CERVICAL REGION: ICD-10-CM

## 2024-09-04 DIAGNOSIS — G44.059 SHORT LASTING UNILATERAL NEURALGIFORM HEADACHE WITH CONJUNCTIVAL INJECTION AND TEARING (SUNCT), NOT INTRACTABLE: ICD-10-CM

## 2024-09-04 DIAGNOSIS — H81.10 BENIGN PAROXYSMAL VERTIGO, UNSPECIFIED EAR: ICD-10-CM

## 2024-09-04 DIAGNOSIS — M54.81 OCCIPITAL NEURALGIA: ICD-10-CM

## 2024-09-04 DIAGNOSIS — M51.36 OTHER INTERVERTEBRAL DISC DEGENERATION, LUMBAR REGION: ICD-10-CM

## 2024-09-04 DIAGNOSIS — M54.16 RADICULOPATHY, LUMBAR REGION: ICD-10-CM

## 2024-09-04 DIAGNOSIS — M50.90 CERVICAL DISC DISORDER, UNSPECIFIED, UNSPECIFIED CERVICAL REGION: ICD-10-CM

## 2024-09-04 PROCEDURE — G2211 COMPLEX E/M VISIT ADD ON: CPT | Mod: NC

## 2024-09-04 PROCEDURE — 99215 OFFICE O/P EST HI 40 MIN: CPT

## 2024-09-04 NOTE — DATA REVIEWED
[de-identified] : EMG/NCS of LE 1/2023 @ [facility] was normal. \par  \par  Neuropathy labs 12/2022 WNL, mild elevation in K/L FLC ratio (1.85).\par  \par  normal EMG/NCS 11/2020 [de-identified] : MRI Evaluation of the Lumbar Spine performed on 01/13/24 shows Moderate multilevel lower thoracic and lumbar spondylosis in the setting of multilevel spondylolisthesis and broad levocurvature. * Multilevel mild spinal canal stenosis. Multilevel foraminal narrowing, severe on the left at L5-S1 with contact on the left L5 exiting nerve. Marked decrease in the size of the left facet cyst.  MRI Evaluation of the Cervical Spine performed on 01/13/24 shows Moderate multilevel cervical spondylosis with multilevel spondylolisthesis with multilevel spinal canal and foraminal narrowing, similar to prior. * Small foci of cord signal hyperintensity at C4 and C5 which may be myelomalacia.    MRI brain w/w/o contrast 12/20/2023 with mild microvascular ischemic changes and MRA/V H normal.  MRI brain 11/2022 showed non specific subcortical white matter lesions, likely 2/2 to hx of migraine headaches. No concerning pathology in brain.  MR C/T/L with/without contrast spine 11/1/2022- "1. CERVICAL SPINE: Widespread moderate cervical degenerative disc disease, most pronounced in the mid cervical spine. No high grade canal compromise. Reversal of the normal cervical lordosis suggestive of disc pathology and / or muscle spasm. Cervical cord appears intact without focal intrinsic lesion.    Reviewed MRI cervical spine images/report from [facility] 8/20/2020- multilevel cervical spondylosis with posterior osteophytes indenting the anterior aspect of thecal sac most marked at the C3-4 and C5-6 levels with AP diameter spinal canal measures 7 mm. Moderate severe left C3-4, severe bilateral C4-5, severe bilateral C5-6 and mild bilateral C6-7 foraminal stenosis. No cord signal abnormality.   2. THORACIC SPINE: Widespread mild to moderate thoracic degenerative disc disease. No high-grade canal compromise. Thoracic cord appears intact without focal intrinsic lesion  3. LUMBAR SPINE: Widespread lumbar degenerative disc disease results in degenerative foraminal compromise greatestto the left at L5-S1 where synovial cyst contributes to compromise the ventrolateral recesses of the canal. Asymmetric fusiform thickening of the left S1 nerve root, suspect radiculopathy. Prominent lower lumbar facet arthropathy. No high-grade central canal stenosis. Distal cord and conus intact  4. Gadolinium-enhanced images were not tolerated this patient at the time of imaging. Completion gadolinium-enhanced images may be considered.  5. Structure in the right pelvis appears todeform the right dome of the bladder. This is incompletely evaluated, signal intensity pattern atypical for the uterus, possibly ovarian. Supplemental evaluation recommended such as by transvaginal pelvic ultrasound or pelvic MR"

## 2024-09-04 NOTE — PHYSICAL EXAM
[FreeTextEntry1] : The patient is alert, attentive, and conversational memory intact No dysarthria Face is symmetric with normal eye closure and smile. Tenderness over L occipital region.  Motor: Strength is full bilaterally. 5/5 muscle power in bilateral UE and LE. Reflexes: Biceps +2 b/l, R Patellar and ankle reflex 2+ and Left Patellar and ankle reflex 3+. Plantar responses are flexor. No clonus Sensory: Diminished pinprick over RLE.  Gait/Stance: Gait is steady with normal steps.

## 2024-09-04 NOTE — HISTORY OF PRESENT ILLNESS
[FreeTextEntry1] : INTERIM HX 09/04/2024: Since our last visit she has had 3 bouts of vertigo, brief episodes. Had similar vertigo 15 years ago. Head movements trigger the vertigo. No head injury. Also, in the last few weeks been experiencing L sided occipital neuralgia pain. Seeing a functional medicine doctor. Quit gluten and dairy.  SUNCT headaches are mild and occur less frequently.  radiculopathy stable, worse over LLE.   INTERIM HX 05/15/2024: MR C and L spine completed 1/2024 (ordered by Ortho, scan reviewed)- C spine MRI with moderate multilevel spondylosis and small foci of myelomalacia at C4 and C5. L spine MRI with moderate multilevel spondylosis as well with foraminal narrowing (severe on the left at L5.S1 that contacts exiting L5 nerve root).  She is seeing functional medicine. Getting lumbar nerve block, got one already- it helped for a few days.  "low grade headaches"- frontal, "low ache".  no congestion. Takes tylenol or advil. no photophpbia or phonophobia.  SUNCT headaches resolved, dc'd indomethacin. + dry eyes. Got plugs.   INTERIM HX 01/10/2024: MRI brain w/w/o contrast 12/20/2023 with mild microvascular ischemic changes and MRA/V H normal.  EMG/NCS of LE 12/21/2023 normal Headaches better. Taking indomethacin 25 mg BID, could not tolerate higher doses because of bruising.  She stopped amitriptyline 1 week ago, ran out, does not wish to resume at this time.  Saw ortho spine last week, repeat spine imaging recommended.  In PT x 5 weeks.  + Neck pain. + L occipital neuralgia.  No radicular pains or paresthesia's in arms.  LLE: paresthesias and numbness from mid thigh to feet RLE: tingling in toes and sole.    INTERIM HX 12/13/2023: - This is a Telehealth 2-way video visit- Here to discuss new symptoms. Headaches.  Several days of intermittent stabbing pain left eyeball/forehead pain, comes and goes. Different than usual migraines. Lasts seconds and repeats several times during the day (multiple times an hour). episode of left eye redness during episode. Seen by ophthalmologist, noted to have anisocoria, otherwise tested normal. Pain is better this week. L eye can be teary and swollen.   No hx of GI bleeding or definite ulcers.   INTERIM HX 10/23/2023: - This is a Telehealth 2-way video visit- Things are "not great". Right foot symptoms improving, tingling now only in R toes.  L leg symptoms with certain activities, like running or going upstairs or sitting for long. Constant, feels like elastic band with electricity running through it from mid-thigh to toes. +LBP.  On gabapentin 600 mg mg qhs, she was on 1800 mg QD with no benefit- ran out of script and self-tapered down. Also on amitriptyline 10 mg qhs (20 mg caused "Tics"). The meds just take a little edge off. Tried JAYDEN- made it worse.  Tried duloxetine, believes she had a side effect.   INTERIM HX 07/12/2023: EMG/NCS of LE 1/2023 @ [facility] was normal. Neuropathy labs 12/2022 WNL, mild elevation in K/L FLC ratio (1.85). Symptoms are stable. not worse or better.  Occasional L occipital shooting pain.  On gabapentin 300 mg qhs, higher dose did not significantly improve symptoms (however did not give it much time), and stopping would made symptoms worse.  Seeing rodrigo, Supriya Mclaughlin in [facility], on amitriptyline 10 mg qhs and naltrexone.  ------------------------------------------------------------------------------------------------------------------------------------ HPI (initial visit Dec 27, 2022)- JOSE HERNÁNDEZ is a 62 year old woman with hx of degenerative spine disease with cervical spine stenosis (followed by Orthopedic, Dr. Irving), Migraine headaches, Asthma, right uterine fibroid (s/p recent hysterectomy 11/17) was admitted to [facility] 10/31/22-11/2/22 for newonset ascending lower extremity paresthesia's and gait instability since 10/29/2022, after receiving vaccinations for COVID and the Flu. She was admitted to rule out possible GBS vs transverse myelitis.   She reports symptoms began within 24 hours of receiving the vaccination. She reported waking up with pins and needles over entire left foot and this spread up to her left leg and then later also felt paresthesias in RLE. She denied any weakness, though walking was difficult 2/2 to the pain. No B/B issues. Symptoms worse over left leg.   Admission exam: Neurological exam with no motor weakness in UE or LE. Slight increased tone at the ankles (L>R). Reflexes 2+ in UE and LE, thought relatively brisk R biceps compared to left biceps. No ty, no cross adductors, no clonus. Plantar reflex down going on right and mute on the left. Sensations reduced to pinprick over left leg (over shin), though intact over foot. Vibration sensation intact in UE and asymmetric in LE (R toe 10 seconds, L toe 20 seconds, R knee 16 seconds, L knee 9 seconds). Abnormal gait, drags feet, slightly spastic appearing. Cannot tandem walk. Romberg is absent.  During admission, MRI scans of neuroaxis were ordered and are detailed below. Patient could not tolerate contrast studies. MRI brain showed non specific subcortical white matter lesions, likely 2/2 to hx of migraine headaches. No concerning pathology in brain. MRI cervical spine with moderate degenerative disc disease and normal spinal cord signal (seems overall stable compared to prior scan at [facility]) MRI thoracic spine with mild-mod degenerative disc disease and normal spinal cord signal. MRI lumbar spine with wide spread degenerative disc disease, with neural foraminal narrowing worse at left L5/S1 (there is also a synovial cyst at this level that narrows the canal). Distal cord and conus intact.   Given the absence of cord signal changes on MRI, transverse myelitis was considered unlikely. Additionally, given intact reflexes and normal strength exam, GBS was also considered unlikely. Her symptoms did improve slightly in the hospital with starting gabapentin. Hence, given findings noted on lumbar spine with degenerative changes, she was diagnosed with lumbar radiculopathy. She was discharged on medrol dose pack and gabapentin. Outpatient PT and Ortho follow up.   She continues on gabapentin 300 mg TID, tolerating well, though continues to experience constant tingling in both her legs (L>R). She denies any numbness. Feels like rubber band around feet. No weakness. Balance good. Good control over bowel and bladder. She reports she can function fully, though the sensations are bothersome. In the left leg she feels the sensations up to her knees and up to ankles in R leg. She also reports occasional "Sciatica" in RLE. No significant lower back pain. She also reports occasional sharp pains over shoulder, more so with movements of her neck.

## 2024-09-04 NOTE — ASSESSMENT
[FreeTextEntry1] : Assessment/Plan: 1 # Episodic peripheral vertigo- most c/w BPPV Discussed Epley maneuver Avoid triggers - rapid head movements  2 # left sided occipital neuralgia-  Recommend course of medrol dose pack- pt deferred She will be seeing pain management specialist for nerve block.   3 # left sided headaches a/w autonomic symptoms since 12/2023- most suggestive of SUNCT MRI brain w/o acute pathology, MRA/V head normal. s/p trial of indomethacin. Headache frequency and intensity have significantly improved, tolerable.   4 # Paresthesia's in b/l LE (L>R) since 10/2022- lumbar radiculopathy +/- small fiber neuropathy 2/2 COVID vaccination Hx of degenerative cervical and lumbar disc disease. Received COVID/FLU vaccinations 10/28/2022. MR C and L spine completed 1/2024 (ordered by Ortho, scan reviewed)- C spine MRI with moderate multilevel spondylosis and small foci of myelomalacia at C4 and C5. L spine MRI with moderate multilevel spondylosis as well with foraminal narrowing (severe on the left at L5/S1 that contacts exiting L5 nerve root). EMG/NCS 1/2023 of LE normal. Neuropathy labs 12/2022 normal. EMG/NCS of LE 12/21/2023 normal  Neurological exam with no motor weakness, intact reflexes (though relatively hyperreflexic in LLE) and reduced vibratory/pinprick sense in RLE.  Imp- Given asymmetry in paresthesia's in LE, suspect symptoms 2/2 lumbar radiculopathy rather than small fiber neuropathy.  Plan:- [] Continue Lyrica 50 mg QHS. Dc'd gabapentin (in effective). Pt self dc'd amitriptyline. [] Recommend lidocaine patch 4% for LBP [] Continue to follow up with ortho spine (Dr. Irving). No surgical intervention recommended at this time.  Return to clinic 6 months   The above plan was discussed with JOSE HERNÁNDEZ in great detail. JOSE HERNÁNDEZ verbalized understanding and agrees with plan as detailed above. She was advised to call our clinic at [phone] for any new or worsening symptoms, or with any questions or concerns. JOSE HERNÁNDEZ expressed understanding and all her questions/concerns were addressed.    Shakira Márquez M.D.

## 2024-10-01 ENCOUNTER — TRANSCRIPTION ENCOUNTER (OUTPATIENT)
Age: 64
End: 2024-10-01

## 2024-12-09 ENCOUNTER — APPOINTMENT (OUTPATIENT)
Dept: UROLOGY | Facility: CLINIC | Age: 64
End: 2024-12-09
Payer: COMMERCIAL

## 2024-12-09 ENCOUNTER — LABORATORY RESULT (OUTPATIENT)
Age: 64
End: 2024-12-09

## 2024-12-09 VITALS
HEART RATE: 62 BPM | TEMPERATURE: 97.8 F | DIASTOLIC BLOOD PRESSURE: 72 MMHG | OXYGEN SATURATION: 99 % | BODY MASS INDEX: 22.45 KG/M2 | WEIGHT: 122 LBS | RESPIRATION RATE: 17 BRPM | HEIGHT: 62 IN | SYSTOLIC BLOOD PRESSURE: 109 MMHG

## 2024-12-09 DIAGNOSIS — R10.9 UNSPECIFIED ABDOMINAL PAIN: ICD-10-CM

## 2024-12-09 DIAGNOSIS — E78.00 PURE HYPERCHOLESTEROLEMIA, UNSPECIFIED: ICD-10-CM

## 2024-12-09 DIAGNOSIS — N39.41 URGE INCONTINENCE: ICD-10-CM

## 2024-12-09 DIAGNOSIS — Z80.1 FAMILY HISTORY OF MALIGNANT NEOPLASM OF TRACHEA, BRONCHUS AND LUNG: ICD-10-CM

## 2024-12-09 DIAGNOSIS — G62.9 POLYNEUROPATHY, UNSPECIFIED: ICD-10-CM

## 2024-12-09 DIAGNOSIS — R39.15 URGENCY OF URINATION: ICD-10-CM

## 2024-12-09 DIAGNOSIS — R39.9 UNSPECIFIED SYMPTOMS AND SIGNS INVOLVING THE GENITOURINARY SYSTEM: ICD-10-CM

## 2024-12-09 DIAGNOSIS — Z87.891 PERSONAL HISTORY OF NICOTINE DEPENDENCE: ICD-10-CM

## 2024-12-09 PROCEDURE — 99203 OFFICE O/P NEW LOW 30 MIN: CPT

## 2024-12-10 LAB
APPEARANCE: CLEAR
BILIRUBIN URINE: NEGATIVE
BLOOD URINE: NEGATIVE
COLOR: YELLOW
GLUCOSE QUALITATIVE U: NEGATIVE MG/DL
KETONES URINE: NEGATIVE MG/DL
LEUKOCYTE ESTERASE URINE: ABNORMAL
NITRITE URINE: NEGATIVE
PH URINE: 7.5
PROTEIN URINE: NEGATIVE MG/DL
SPECIFIC GRAVITY URINE: 1.01
URINE CYTOLOGY: NORMAL
UROBILINOGEN URINE: 0.2 MG/DL

## 2024-12-16 ENCOUNTER — APPOINTMENT (OUTPATIENT)
Dept: ORTHOPEDIC SURGERY | Facility: CLINIC | Age: 64
End: 2024-12-16
Payer: COMMERCIAL

## 2024-12-16 DIAGNOSIS — M41.9 SCOLIOSIS, UNSPECIFIED: ICD-10-CM

## 2024-12-16 PROCEDURE — 72070 X-RAY EXAM THORAC SPINE 2VWS: CPT

## 2024-12-16 PROCEDURE — 99214 OFFICE O/P EST MOD 30 MIN: CPT

## 2024-12-16 PROCEDURE — 72100 X-RAY EXAM L-S SPINE 2/3 VWS: CPT

## 2025-01-15 ENCOUNTER — TRANSCRIPTION ENCOUNTER (OUTPATIENT)
Age: 65
End: 2025-01-15

## 2025-02-18 ENCOUNTER — RX RENEWAL (OUTPATIENT)
Age: 65
End: 2025-02-18

## 2025-03-19 ENCOUNTER — APPOINTMENT (OUTPATIENT)
Dept: NEUROLOGY | Facility: CLINIC | Age: 65
End: 2025-03-19
Payer: COMMERCIAL

## 2025-03-19 VITALS
SYSTOLIC BLOOD PRESSURE: 123 MMHG | DIASTOLIC BLOOD PRESSURE: 83 MMHG | BODY MASS INDEX: 23 KG/M2 | OXYGEN SATURATION: 96 % | HEIGHT: 62 IN | HEART RATE: 65 BPM | WEIGHT: 125 LBS

## 2025-03-19 DIAGNOSIS — M54.81 OCCIPITAL NEURALGIA: ICD-10-CM

## 2025-03-19 DIAGNOSIS — G44.059 SHORT LASTING UNILATERAL NEURALGIFORM HEADACHE WITH CONJUNCTIVAL INJECTION AND TEARING (SUNCT), NOT INTRACTABLE: ICD-10-CM

## 2025-03-19 DIAGNOSIS — M50.90 CERVICAL DISC DISORDER, UNSPECIFIED, UNSPECIFIED CERVICAL REGION: ICD-10-CM

## 2025-03-19 DIAGNOSIS — M54.16 RADICULOPATHY, LUMBAR REGION: ICD-10-CM

## 2025-03-19 DIAGNOSIS — R20.2 PARESTHESIA OF SKIN: ICD-10-CM

## 2025-03-19 DIAGNOSIS — G62.9 POLYNEUROPATHY, UNSPECIFIED: ICD-10-CM

## 2025-03-19 PROCEDURE — 99214 OFFICE O/P EST MOD 30 MIN: CPT

## 2025-03-19 PROCEDURE — G2211 COMPLEX E/M VISIT ADD ON: CPT | Mod: NC

## 2025-07-10 ENCOUNTER — TRANSCRIPTION ENCOUNTER (OUTPATIENT)
Age: 65
End: 2025-07-10

## 2025-07-10 RX ORDER — INDOMETHACIN 25 MG/1
25 CAPSULE ORAL 3 TIMES DAILY
Qty: 90 | Refills: 2 | Status: ACTIVE | COMMUNITY
Start: 2025-07-10 | End: 1900-01-01

## 2025-07-10 RX ORDER — PANTOPRAZOLE 40 MG/1
40 TABLET, DELAYED RELEASE ORAL
Qty: 30 | Refills: 6 | Status: ACTIVE | COMMUNITY
Start: 2025-07-10 | End: 1900-01-01

## 2025-09-10 ENCOUNTER — APPOINTMENT (OUTPATIENT)
Dept: NEUROLOGY | Facility: CLINIC | Age: 65
End: 2025-09-10
Payer: COMMERCIAL

## 2025-09-10 VITALS
HEART RATE: 60 BPM | OXYGEN SATURATION: 100 % | BODY MASS INDEX: 23 KG/M2 | SYSTOLIC BLOOD PRESSURE: 108 MMHG | HEIGHT: 62 IN | WEIGHT: 125 LBS | DIASTOLIC BLOOD PRESSURE: 71 MMHG

## 2025-09-10 PROCEDURE — G2211 COMPLEX E/M VISIT ADD ON: CPT | Mod: NC

## 2025-09-10 PROCEDURE — 99214 OFFICE O/P EST MOD 30 MIN: CPT
